# Patient Record
Sex: FEMALE | Race: ASIAN | NOT HISPANIC OR LATINO | URBAN - METROPOLITAN AREA
[De-identification: names, ages, dates, MRNs, and addresses within clinical notes are randomized per-mention and may not be internally consistent; named-entity substitution may affect disease eponyms.]

---

## 2017-11-02 ENCOUNTER — INPATIENT (INPATIENT)
Facility: HOSPITAL | Age: 82
LOS: 11 days | Discharge: HOME | DRG: 66 | End: 2017-11-14
Attending: INTERNAL MEDICINE | Admitting: INTERNAL MEDICINE
Payer: MEDICAID

## 2017-11-02 VITALS
HEART RATE: 87 BPM | HEIGHT: 60 IN | TEMPERATURE: 99 F | WEIGHT: 104.94 LBS | RESPIRATION RATE: 18 BRPM | SYSTOLIC BLOOD PRESSURE: 132 MMHG | OXYGEN SATURATION: 98 % | DIASTOLIC BLOOD PRESSURE: 70 MMHG

## 2017-11-02 DIAGNOSIS — I10 ESSENTIAL (PRIMARY) HYPERTENSION: ICD-10-CM

## 2017-11-02 DIAGNOSIS — I48.91 UNSPECIFIED ATRIAL FIBRILLATION: ICD-10-CM

## 2017-11-02 DIAGNOSIS — R47.81 SLURRED SPEECH: ICD-10-CM

## 2017-11-02 DIAGNOSIS — Z29.9 ENCOUNTER FOR PROPHYLACTIC MEASURES, UNSPECIFIED: ICD-10-CM

## 2017-11-02 LAB
ANION GAP SERPL CALC-SCNC: 8 MMOL/L — SIGNIFICANT CHANGE UP (ref 5–17)
ANION GAP SERPL CALC-SCNC: 8 MMOL/L — SIGNIFICANT CHANGE UP (ref 5–17)
APTT BLD: 42 SEC — HIGH (ref 27.5–37.4)
BASOPHILS # BLD AUTO: 0.1 K/UL — SIGNIFICANT CHANGE UP (ref 0–0.2)
BASOPHILS NFR BLD AUTO: 1.5 % — SIGNIFICANT CHANGE UP (ref 0–2)
BUN SERPL-MCNC: 25 MG/DL — HIGH (ref 7–18)
BUN SERPL-MCNC: 26 MG/DL — HIGH (ref 7–18)
CALCIUM SERPL-MCNC: 8.9 MG/DL — SIGNIFICANT CHANGE UP (ref 8.4–10.5)
CALCIUM SERPL-MCNC: 9.1 MG/DL — SIGNIFICANT CHANGE UP (ref 8.4–10.5)
CHLORIDE SERPL-SCNC: 107 MMOL/L — SIGNIFICANT CHANGE UP (ref 96–108)
CHLORIDE SERPL-SCNC: 108 MMOL/L — SIGNIFICANT CHANGE UP (ref 96–108)
CK MB BLD-MCNC: 1.3 % — SIGNIFICANT CHANGE UP (ref 0–3.5)
CK MB CFR SERPL CALC: 1.3 NG/ML — SIGNIFICANT CHANGE UP (ref 0–3.6)
CK MB CFR SERPL CALC: SIGNIFICANT CHANGE UP NG/ML (ref 0–3.6)
CK SERPL-CCNC: 100 U/L — SIGNIFICANT CHANGE UP (ref 21–215)
CK SERPL-CCNC: SIGNIFICANT CHANGE UP U/L (ref 21–215)
CO2 SERPL-SCNC: 24 MMOL/L — SIGNIFICANT CHANGE UP (ref 22–31)
CO2 SERPL-SCNC: 27 MMOL/L — SIGNIFICANT CHANGE UP (ref 22–31)
CREAT SERPL-MCNC: 1.16 MG/DL — SIGNIFICANT CHANGE UP (ref 0.5–1.3)
CREAT SERPL-MCNC: 1.22 MG/DL — SIGNIFICANT CHANGE UP (ref 0.5–1.3)
EOSINOPHIL # BLD AUTO: 0 K/UL — SIGNIFICANT CHANGE UP (ref 0–0.5)
EOSINOPHIL NFR BLD AUTO: 0.6 % — SIGNIFICANT CHANGE UP (ref 0–6)
GLUCOSE SERPL-MCNC: 84 MG/DL — SIGNIFICANT CHANGE UP (ref 70–99)
GLUCOSE SERPL-MCNC: 88 MG/DL — SIGNIFICANT CHANGE UP (ref 70–99)
HCT VFR BLD CALC: 38.3 % — SIGNIFICANT CHANGE UP (ref 34.5–45)
HGB BLD-MCNC: 12 G/DL — SIGNIFICANT CHANGE UP (ref 11.5–15.5)
INR BLD: 1.39 RATIO — HIGH (ref 0.88–1.16)
LYMPHOCYTES # BLD AUTO: 1.6 K/UL — SIGNIFICANT CHANGE UP (ref 1–3.3)
LYMPHOCYTES # BLD AUTO: 27.3 % — SIGNIFICANT CHANGE UP (ref 13–44)
MCHC RBC-ENTMCNC: 31.4 GM/DL — LOW (ref 32–36)
MCHC RBC-ENTMCNC: 31.4 PG — SIGNIFICANT CHANGE UP (ref 27–34)
MCV RBC AUTO: 100.2 FL — HIGH (ref 80–100)
MONOCYTES # BLD AUTO: 0.6 K/UL — SIGNIFICANT CHANGE UP (ref 0–0.9)
MONOCYTES NFR BLD AUTO: 10 % — SIGNIFICANT CHANGE UP (ref 2–14)
NEUTROPHILS # BLD AUTO: 3.5 K/UL — SIGNIFICANT CHANGE UP (ref 1.8–7.4)
NEUTROPHILS NFR BLD AUTO: 60.6 % — SIGNIFICANT CHANGE UP (ref 43–77)
PLATELET # BLD AUTO: 185 K/UL — SIGNIFICANT CHANGE UP (ref 150–400)
POTASSIUM SERPL-MCNC: 3.8 MMOL/L — SIGNIFICANT CHANGE UP (ref 3.5–5.3)
POTASSIUM SERPL-MCNC: SIGNIFICANT CHANGE UP MMOL/L (ref 3.5–5.3)
POTASSIUM SERPL-SCNC: 3.8 MMOL/L — SIGNIFICANT CHANGE UP (ref 3.5–5.3)
POTASSIUM SERPL-SCNC: SIGNIFICANT CHANGE UP MMOL/L (ref 3.5–5.3)
PROTHROM AB SERPL-ACNC: 15.2 SEC — HIGH (ref 9.8–12.7)
RBC # BLD: 3.82 M/UL — SIGNIFICANT CHANGE UP (ref 3.8–5.2)
RBC # FLD: 13.7 % — SIGNIFICANT CHANGE UP (ref 10.3–14.5)
SODIUM SERPL-SCNC: 139 MMOL/L — SIGNIFICANT CHANGE UP (ref 135–145)
SODIUM SERPL-SCNC: 143 MMOL/L — SIGNIFICANT CHANGE UP (ref 135–145)
TROPONIN I SERPL-MCNC: <0.015 NG/ML — SIGNIFICANT CHANGE UP (ref 0–0.04)
TROPONIN I SERPL-MCNC: <0.015 NG/ML — SIGNIFICANT CHANGE UP (ref 0–0.04)
WBC # BLD: 5.8 K/UL — SIGNIFICANT CHANGE UP (ref 3.8–10.5)
WBC # FLD AUTO: 5.8 K/UL — SIGNIFICANT CHANGE UP (ref 3.8–10.5)

## 2017-11-02 PROCEDURE — 99285 EMERGENCY DEPT VISIT HI MDM: CPT

## 2017-11-02 PROCEDURE — 71010: CPT | Mod: 26

## 2017-11-02 PROCEDURE — 70450 CT HEAD/BRAIN W/O DYE: CPT | Mod: 26

## 2017-11-02 RX ORDER — FENOFIBRATE,MICRONIZED 130 MG
145 CAPSULE ORAL DAILY
Qty: 0 | Refills: 0 | Status: DISCONTINUED | OUTPATIENT
Start: 2017-11-02 | End: 2017-11-14

## 2017-11-02 RX ORDER — HEPARIN SODIUM 5000 [USP'U]/ML
5000 INJECTION INTRAVENOUS; SUBCUTANEOUS EVERY 12 HOURS
Qty: 0 | Refills: 0 | Status: DISCONTINUED | OUTPATIENT
Start: 2017-11-02 | End: 2017-11-02

## 2017-11-02 RX ORDER — METOPROLOL TARTRATE 50 MG
12.5 TABLET ORAL
Qty: 0 | Refills: 0 | Status: DISCONTINUED | OUTPATIENT
Start: 2017-11-02 | End: 2017-11-02

## 2017-11-02 RX ORDER — ASPIRIN/CALCIUM CARB/MAGNESIUM 324 MG
325 TABLET ORAL ONCE
Qty: 0 | Refills: 0 | Status: COMPLETED | OUTPATIENT
Start: 2017-11-02 | End: 2017-11-02

## 2017-11-02 RX ORDER — ASPIRIN/CALCIUM CARB/MAGNESIUM 324 MG
81 TABLET ORAL DAILY
Qty: 0 | Refills: 0 | Status: DISCONTINUED | OUTPATIENT
Start: 2017-11-02 | End: 2017-11-03

## 2017-11-02 RX ORDER — RIVAROXABAN 15 MG-20MG
15 KIT ORAL EVERY 24 HOURS
Qty: 0 | Refills: 0 | Status: DISCONTINUED | OUTPATIENT
Start: 2017-11-02 | End: 2017-11-04

## 2017-11-02 RX ORDER — ATORVASTATIN CALCIUM 80 MG/1
10 TABLET, FILM COATED ORAL AT BEDTIME
Qty: 0 | Refills: 0 | Status: DISCONTINUED | OUTPATIENT
Start: 2017-11-02 | End: 2017-11-03

## 2017-11-02 RX ORDER — METOPROLOL TARTRATE 50 MG
25 TABLET ORAL
Qty: 0 | Refills: 0 | Status: DISCONTINUED | OUTPATIENT
Start: 2017-11-02 | End: 2017-11-14

## 2017-11-02 RX ADMIN — Medication 325 MILLIGRAM(S): at 20:03

## 2017-11-02 NOTE — H&P ADULT - PROBLEM SELECTOR PLAN 4
-Improve score 2, given age and immobilisation  -Heparin sub q -Improve score 2, given age and immobilisation.  -Heparin sub q

## 2017-11-02 NOTE — H&P ADULT - HISTORY OF PRESENT ILLNESS
86 y/o female with significant PMHx of HTN, BIB family to the ED c/o slurred speech x yesterday morning. Son reports pt was drinking water normally, pt started to have slurred speech. Went to PMD: suspected [place] Palsy, was sent to neurologist who sent pt to ED for CAT scan of head. Son reports pt's speech is slightly different as compared to baseline. Pt denies vomiting, CP, SOB, LOC, HA, diaphoresis, dizziness, visual aura, blurry vision, neck stiffness, numbness, weakness, tingling, use of drugs/ETOH/cigarettes, or any other complaints.      SH: 84 y/o female with significant PMHx of HTN, BIB family to the ED c/o slurred speech x yesterday morning. Son reports pt was drinking water normally, pt started to have slurred speech. Went to PMD: suspected [place] Palsy, was sent to neurologist who sent pt to ED for CAT scan of head. Son reports pt's speech is slightly different as compared to baseline. Pt denies vomiting, CP, SOB, LOC, HA, diaphoresis, dizziness, visual aura, blurry vision, neck stiffness, numbness, weakness, tingling, use of drugs/ETOH/cigarettes, or any other complaints.      SH: 86 y/o [place] speaking female ( ID [id number]) with significant PMHx of HTN, A fib (on Xarelto was brought to Ed by family for slurred speech x yesterday morning.   Son started to have slurring of speech.     Went to PMD: suspected [place] Palsy, was sent to neurologist who sent pt to ED for CAT scan of head. Son reports pt's speech is slightly different as compared to baseline.   Pt denies vomiting, CP, SOB, LOC, HA, diaphoresis, dizziness, visual aura, blurry vision, neck stiffness, numbness, weakness, tingling, use of drugs/ETOH/cigarettes, or any other complaints.      SH: 86 y/o [place] speaking female ( ID [id number]) with significant PMHx of HTN, A fib (on Xarelto was brought to Ed by family for slurred speech x yesterday morning.   Patient states that she started to have slurring of speech and went to PMD who suspected [place] Palsy, and sent patient to neurologist Dr Vega.   Dr Vega sent pt to ED for suspicion of stroke.  Son reports patient speech is improving but still have slurring of speech.     Denies nausea, vomiting, chest pain, shortness of breath, headache, abdominal pain or pedal edema.     SH: Non smoker, non alcoholic, denies illicit drug use.   FH: Htn in Father

## 2017-11-02 NOTE — ED PROVIDER NOTE - OBJECTIVE STATEMENT
Limited details in HPI and ROS obtained due to pt being slightly non-verbal: details as per family. 86 y/o female with significant PMHx of HTN, BIB family to the ED c/o slurred speech x yesterday morning. Son reports pt was drinking water normally, pt started to have slurred speech. Went to PMD: suspected [place] Palsy, was sent to neurologist who sent pt to ED for CAT scan of head. Son reports pt's speech is slightly different as compared to baseline. Pt denies vomiting, CP, SOB, LOC, HA, diaphoresis, dizziness, visual aura, blurry vision, neck stiffness, numbness, weakness, tingling, use of drugs/ETOH/cigarettes, or any other complaints. Currently taking: Xarelto. PMD: Dr. Dr. Springer

## 2017-11-02 NOTE — H&P ADULT - PROBLEM SELECTOR PLAN 1
-Patient presented with slurring of speech started today  -Admit to telemetry to r/o cardiac arrhythmias.  -T1 -ve, f/u T2 and t3  -EKG:  -Started aspirin, statin. Hold PO medication to allow permissive hypertension. Restart after 24 hours.   -Follow MRI head. MRA head and neck  -Follow Echo. -Patient presented with slurring of speech started yesterday concern for stroke  -Admit to telemetry to r/o cardiac arrhythmias.  -T1 -ve, f/u T2 and t3  -EKG: A fib at 82 bpm  -CT head No CT evidence for an acute territorial infarct. 2 small age indeterminate lacunar infarcts.  -Started aspirin, statin. Hold PO medication to allow permissive hypertension. Restart after 24 hours.   -Follow MRI head. MRA head and neck  -Follow Echo. -Patient presented with slurring of speech started yesterday concern for stroke  -Admit to telemetry to r/o cardiac arrhythmias.  -T1 -ve, f/u T2 and t3  -EKG: A fib at 82 bpm  -CT head No CT evidence for an acute territorial infarct. 2 small age indeterminate lacunar infarcts.  -Concerns for stroke as patient has h/o A Fib and is on subtherapeutic dose of Xarelto 15mg daily.   -Started aspirin, statin. Hold PO medication to allow permissive hypertension. Restart after 24 hours.   -Follow MRI head. MRA head and neck  -Follow Echo. -Patient presented with slurring of speech started yesterday concern for stroke  -Admit to telemetry to r/o cardiac arrhythmias.  -T1 -ve, f/u T2 and t3  -EKG: A fib at 82 bpm  -CT head No CT evidence for an acute territorial infarct. 2 small age indeterminate lacunar infarcts.  -Concerns for stroke as patient has h/o A Fib and but already on Xarelto 15mg daily. Reports compliance.   -Started aspirin, statin. Hold PO medication to allow permissive hypertension. Restart after 24 hours.   -Follow MRI head. MRA head and neck  -Follow Echo. -Patient presented with slurring of speech started yesterday concern for stroke  -Admit to telemetry to r/o cardiac arrhythmias.  -T1 -ve, f/u T2 and t3  -EKG: A fib at 82 bpm  -CT head No CT evidence for an acute territorial infarct. 2 small age indeterminate lacunar infarcts.  -Concerns for stroke as patient has h/o A Fib and but already on Xarelto 15mg daily. Reports compliance.   -Started aspirin, statin. Hold PO medication to allow permissive hypertension. Restart after 24 hours.   -Follow MRI head. MRA head and neck  -Follow Echo.  -Cardio Dr Knight. Neurology: Dr Damon consulted (confirmed with Dr Dyer, outpatient neurology)

## 2017-11-02 NOTE — ED PROVIDER NOTE - CONDUCTED A DETAILED DISCUSSION WITH PATIENT AND/OR GUARDIAN REGARDING, MDM
return to ED if symptoms worsen, persist or questions arise/need for outpatient follow-up need for outpatient follow-up/need to admit

## 2017-11-02 NOTE — H&P ADULT - PROBLEM SELECTOR PLAN 3
-Patient is on Xarelto for AC and on Toprol for rate control.   -Continue Toprol and Xarelto -Patient is on Xarelto for AC and on Toprol for rate control.   -Decrease metoprolol to 25 BID, for rate control and allow permissive htn.  -Continue Xarelto -Patient is on Xarelto for AC and on Toprol for rate control.   -Decrease metoprolol to 25 BID, for rate control and allow permissive htn.  -Continue Xarelto 15mg daily (dose approapiate accorduing to GFR) -Patient is on Xarelto for AC and on Toprol for rate control.   -Decrease metoprolol to 25 BID, for rate control and allow permissive htn.  -Continue Xarelto 15mg daily (dose appropriate according to GFR)

## 2017-11-02 NOTE — ED PROVIDER NOTE - CHPI ED SYMPTOMS NEG
no vomiting, CP, SOB, LOC, HA, diaphoresis, dizziness, visual aura, blurry vision, neck stiffness, numbness, weakness, tingling

## 2017-11-02 NOTE — H&P ADULT - ASSESSMENT
86 y/o [place] speaking female ( ID [id number]) with significant PMHx of HTN, A fib (on Xarelto was brought to Ed by family for slurred speech x yesterday morning.

## 2017-11-03 DIAGNOSIS — I63.9 CEREBRAL INFARCTION, UNSPECIFIED: ICD-10-CM

## 2017-11-03 LAB
ANION GAP SERPL CALC-SCNC: 8 MMOL/L — SIGNIFICANT CHANGE UP (ref 5–17)
BUN SERPL-MCNC: 25 MG/DL — HIGH (ref 7–18)
CALCIUM SERPL-MCNC: 8.5 MG/DL — SIGNIFICANT CHANGE UP (ref 8.4–10.5)
CHLORIDE SERPL-SCNC: 108 MMOL/L — SIGNIFICANT CHANGE UP (ref 96–108)
CHOLEST SERPL-MCNC: 105 MG/DL — SIGNIFICANT CHANGE UP (ref 10–199)
CK MB BLD-MCNC: <1.1 % — SIGNIFICANT CHANGE UP (ref 0–3.5)
CK MB CFR SERPL CALC: <1 NG/ML — SIGNIFICANT CHANGE UP (ref 0–3.6)
CK SERPL-CCNC: 87 U/L — SIGNIFICANT CHANGE UP (ref 21–215)
CO2 SERPL-SCNC: 26 MMOL/L — SIGNIFICANT CHANGE UP (ref 22–31)
CREAT SERPL-MCNC: 1.05 MG/DL — SIGNIFICANT CHANGE UP (ref 0.5–1.3)
GLUCOSE SERPL-MCNC: 78 MG/DL — SIGNIFICANT CHANGE UP (ref 70–99)
HBA1C BLD-MCNC: 5.4 % — SIGNIFICANT CHANGE UP (ref 4–5.6)
HCT VFR BLD CALC: 37 % — SIGNIFICANT CHANGE UP (ref 34.5–45)
HDLC SERPL-MCNC: 51 MG/DL — SIGNIFICANT CHANGE UP (ref 40–125)
HGB BLD-MCNC: 12 G/DL — SIGNIFICANT CHANGE UP (ref 11.5–15.5)
LIPID PNL WITH DIRECT LDL SERPL: 41 MG/DL — SIGNIFICANT CHANGE UP
MAGNESIUM SERPL-MCNC: 2.2 MG/DL — SIGNIFICANT CHANGE UP (ref 1.6–2.6)
MCHC RBC-ENTMCNC: 31.4 PG — SIGNIFICANT CHANGE UP (ref 27–34)
MCHC RBC-ENTMCNC: 32.3 GM/DL — SIGNIFICANT CHANGE UP (ref 32–36)
MCV RBC AUTO: 97.3 FL — SIGNIFICANT CHANGE UP (ref 80–100)
PHOSPHATE SERPL-MCNC: 3 MG/DL — SIGNIFICANT CHANGE UP (ref 2.5–4.5)
PLATELET # BLD AUTO: 179 K/UL — SIGNIFICANT CHANGE UP (ref 150–400)
POTASSIUM SERPL-MCNC: 3.6 MMOL/L — SIGNIFICANT CHANGE UP (ref 3.5–5.3)
POTASSIUM SERPL-SCNC: 3.6 MMOL/L — SIGNIFICANT CHANGE UP (ref 3.5–5.3)
RBC # BLD: 3.8 M/UL — SIGNIFICANT CHANGE UP (ref 3.8–5.2)
RBC # FLD: 13.4 % — SIGNIFICANT CHANGE UP (ref 10.3–14.5)
SODIUM SERPL-SCNC: 142 MMOL/L — SIGNIFICANT CHANGE UP (ref 135–145)
TOTAL CHOLESTEROL/HDL RATIO MEASUREMENT: 2.1 RATIO — LOW (ref 3.3–7.1)
TRIGL SERPL-MCNC: 63 MG/DL — SIGNIFICANT CHANGE UP (ref 10–149)
TROPONIN I SERPL-MCNC: <0.015 NG/ML — SIGNIFICANT CHANGE UP (ref 0–0.04)
TSH SERPL-MCNC: 6.49 UU/ML — HIGH (ref 0.34–4.82)
WBC # BLD: 5.3 K/UL — SIGNIFICANT CHANGE UP (ref 3.8–10.5)
WBC # FLD AUTO: 5.3 K/UL — SIGNIFICANT CHANGE UP (ref 3.8–10.5)

## 2017-11-03 PROCEDURE — 99223 1ST HOSP IP/OBS HIGH 75: CPT

## 2017-11-03 PROCEDURE — 70551 MRI BRAIN STEM W/O DYE: CPT | Mod: 26

## 2017-11-03 PROCEDURE — 70544 MR ANGIOGRAPHY HEAD W/O DYE: CPT | Mod: 26,59

## 2017-11-03 PROCEDURE — 70549 MR ANGIOGRAPH NECK W/O&W/DYE: CPT | Mod: 26

## 2017-11-03 RX ORDER — ASPIRIN/CALCIUM CARB/MAGNESIUM 324 MG
81 TABLET ORAL DAILY
Qty: 0 | Refills: 0 | Status: DISCONTINUED | OUTPATIENT
Start: 2017-11-03 | End: 2017-11-04

## 2017-11-03 RX ORDER — ASPIRIN/CALCIUM CARB/MAGNESIUM 324 MG
81 TABLET ORAL DAILY
Qty: 0 | Refills: 0 | Status: DISCONTINUED | OUTPATIENT
Start: 2017-11-03 | End: 2017-11-03

## 2017-11-03 RX ORDER — ATORVASTATIN CALCIUM 80 MG/1
40 TABLET, FILM COATED ORAL AT BEDTIME
Qty: 0 | Refills: 0 | Status: DISCONTINUED | OUTPATIENT
Start: 2017-11-03 | End: 2017-11-14

## 2017-11-03 RX ADMIN — RIVAROXABAN 15 MILLIGRAM(S): KIT at 21:34

## 2017-11-03 RX ADMIN — Medication 25 MILLIGRAM(S): at 05:56

## 2017-11-03 RX ADMIN — Medication 145 MILLIGRAM(S): at 14:32

## 2017-11-03 RX ADMIN — Medication 25 MILLIGRAM(S): at 19:20

## 2017-11-03 RX ADMIN — ATORVASTATIN CALCIUM 40 MILLIGRAM(S): 80 TABLET, FILM COATED ORAL at 21:34

## 2017-11-03 NOTE — CONSULT NOTE ADULT - ATTENDING COMMENTS
85-year-old woman with h/o A.fib, on Xarelto, now presented with 2-3 days h/o slurred speech and numbness in her tip of the tongue. No focal motor or sensory symptoms.   So, she may have a stroke; either right or left; cortical or subcortical. In this situation- has she failed Xarelto and needs another NOAC.  Going for an MRI-brain

## 2017-11-03 NOTE — CONSULT NOTE ADULT - PROBLEM SELECTOR RECOMMENDATION 2
on Xarelto for now. If her MRI-brain shows stroke, she needs 2D echo and change of her anti-coagulation from Xarelto to something different.

## 2017-11-03 NOTE — PROGRESS NOTE ADULT - SUBJECTIVE AND OBJECTIVE BOX
86 y/o [place] speaking female ( ID [id number]) with significant PMHx of HTN, A fib (on Xarelto was brought to Ed by family for slurred speech x yesterday morning.   Patient states that she started to have slurring of speech and went to PMD who suspected [place] Palsy, and sent patient to neurologist Dr Vega.   Dr Vega sent pt to ED for suspicion of stroke.  Son reports patient speech is improving but still have slurring of speech.     Denies nausea, vomiting, chest pain, shortness of breath, headache, abdominal pain or pedal edema.     SH: Non smoker, non alcoholic, denies illicit drug use.   FH: Htn in Father    Review of Systems:  · Negative General Symptoms	no fever; no chills; no sweating	  · Negative Ophthalmologic Symptoms	no diplopia; no photophobia	  · Negative Respiratory and Thorax Symptoms	no wheezing; no dyspnea; no cough	  · Negative Cardiovascular Symptoms	no chest pain; no palpitations; no dyspnea on exertion	  · Negative Gastrointestinal Symptoms	no nausea; no vomiting; no diarrhea; no constipation	  · Negative General Genitourinary Symptoms	no hematuria; no renal colic	  · Negative Neurological Symptoms	no transient paralysis; no weakness; no paresthesias	      pt seen in icu [  ], reg med floor [   ], bed [  ], chair at bedside [   ], a+o x3 [  ], lethargic [  ],  nad [  ]    moody [  ], ngt [  ], peg [  ], et tube [  ], cent line [  ], picc line [  ]        Allergies    No Known Allergies        Vitals    T(F): 98.1 (11-03-17 @ 07:58), Max: 98.7 (11-02-17 @ 16:29)  HR: 70 (11-03-17 @ 07:58) (70 - 93)  BP: 134/85 (11-03-17 @ 07:58) (129/74 - 155/89)  RR: 18 (11-03-17 @ 07:58) (18 - 18)  SpO2: 100% (11-03-17 @ 07:58) (98% - 100%)  Wt(kg): --  CAPILLARY BLOOD GLUCOSE      POCT Blood Glucose.: 82 mg/dL (02 Nov 2017 16:55)      Labs                          12.0   5.3   )-----------( 179      ( 03 Nov 2017 06:39 )             37.0       11-03    142  |  108  |  25<H>  ----------------------------<  78  3.6   |  26  |  1.05    Ca    8.5      03 Nov 2017 06:39  Phos  3.0     11-03  Mg     2.2     11-03        CARDIAC MARKERS ( 03 Nov 2017 02:49 )  <0.015 ng/mL / x     / 87 U/L / x     / <1.0 ng/mL  CARDIAC MARKERS ( 02 Nov 2017 19:13 )  <0.015 ng/mL / x     / 100 U/L / x     / 1.3 ng/mL  CARDIAC MARKERS ( 02 Nov 2017 18:08 )  hemolzyed The new reference range for Troponin-I performed on the Siemens Vista  system is 0.015-0.045 ng/mL, which includes the 99th percentile of a  healthy reference population. Studies have shown that elevated troponin  levels above the 99th percentile cutoff are associated with an increased  risk for adverse cardiac events, with the risk increasing as troponin  levels increase. As per a joint committee of the American College of  Cardiology and European Society of Cardiology, diagnosis of classic MI is  based upon the detection of a rise or fall of cardiac troponin values,  with at least one value above the 99th percentile upper reference limit,  in the appropriate clinical context.  Troponin-I (ng/mL) Interpretation  0.00-0.045 Normal range (includes the 99th percentile of a healthy  reference population)  >0.045 Elevated troponin level indicating increased risk  Note: Troponin-I and Troponin-T cannot be used interchangeably in serial  measurements. Minimally elevated Troponin results should be interpreted  in the context of clinical findings and risk factors. ng/mL / x     / hemolzyed notified thu zuñiga 11/02/17 18:37 U/L / x     / hemolzyed ng/mL            Radiology Results      Meds    MEDICATIONS  (STANDING):  aspirin  chewable 81 milliGRAM(s) Oral daily  atorvastatin 40 milliGRAM(s) Oral at bedtime  fenofibrate Tablet 145 milliGRAM(s) Oral daily  metoprolol     tartrate 25 milliGRAM(s) Oral two times a day  rivaroxaban 15 milliGRAM(s) Oral every 24 hours      MEDICATIONS  (PRN):      Physical Exam    Neuro :  no focal deficits  Respiratory: CTA B/L  CV: RRR, S1S2, no murmurs,   Abdominal: Soft, NT, ND +BS,  Extremities: No edema, + peripheral pulses    ASSESSMENT    Slurred speech  HTN (hypertension)  No significant past surgical history      PLAN 84 y/o [place] speaking female ( ID [id number]) with significant PMHx of HTN, A fib (on Xarelto was brought to Ed by family for slurred speech x yesterday morning.   Patient states that she started to have slurring of speech and went to PMD who suspected [place] Palsy, and sent patient to neurologist Dr Vega.   Dr Vega sent pt to ED for suspicion of stroke.  Son reports patient speech is improving but still have slurring of speech.     Denies nausea, vomiting, chest pain, shortness of breath, headache, abdominal pain or pedal edema.     SH: Non smoker, non alcoholic, denies illicit drug use.   FH: Htn in Father    Review of Systems:  · Negative General Symptoms	no fever; no chills; no sweating	  · Negative Ophthalmologic Symptoms	no diplopia; no photophobia	  · Negative Respiratory and Thorax Symptoms	no wheezing; no dyspnea; no cough	  · Negative Cardiovascular Symptoms	no chest pain; no palpitations; no dyspnea on exertion	  · Negative Gastrointestinal Symptoms	no nausea; no vomiting; no diarrhea; no constipation	  · Negative General Genitourinary Symptoms	no hematuria; no renal colic	  · Negative Neurological Symptoms	no transient paralysis; no weakness; no paresthesias	      pt seen in tele [x  ], reg med floor [   ], bed [x  ], chair at bedside [   ], a+o x3 [ x ], lethargic [  ],  nad [x  ]    Allergies    No Known Allergies        Vitals    T(F): 98.1 (11-03-17 @ 07:58), Max: 98.7 (11-02-17 @ 16:29)  HR: 70 (11-03-17 @ 07:58) (70 - 93)  BP: 134/85 (11-03-17 @ 07:58) (129/74 - 155/89)  RR: 18 (11-03-17 @ 07:58) (18 - 18)  SpO2: 100% (11-03-17 @ 07:58) (98% - 100%)  Wt(kg): --  CAPILLARY BLOOD GLUCOSE      POCT Blood Glucose.: 82 mg/dL (02 Nov 2017 16:55)      Labs                          12.0   5.3   )-----------( 179      ( 03 Nov 2017 06:39 )             37.0       11-03    142  |  108  |  25<H>  ----------------------------<  78  3.6   |  26  |  1.05    Ca    8.5      03 Nov 2017 06:39  Phos  3.0     11-03  Mg     2.2     11-03        CARDIAC MARKERS ( 03 Nov 2017 02:49 )  <0.015 ng/mL / x     / 87 U/L / x     / <1.0 ng/mL  CARDIAC MARKERS ( 02 Nov 2017 19:13 )  <0.015 ng/mL / x     / 100 U/L / x     / 1.3 ng/mL  CARDIAC MARKERS ( 02 Nov 2017 18:08 )  hemolzyed The new reference range for Troponin-I performed on the Siemens Vista  system is 0.015-0.045 ng/mL, which includes the 99th percentile of a  healthy reference population. Studies have shown that elevated troponin  levels above the 99th percentile cutoff are associated with an increased  risk for adverse cardiac events, with the risk increasing as troponin  levels increase. As per a joint committee of the American College of  Cardiology and European Society of Cardiology, diagnosis of classic MI is  based upon the detection of a rise or fall of cardiac troponin values,  with at least one value above the 99th percentile upper reference limit,  in the appropriate clinical context.  Troponin-I (ng/mL) Interpretation  0.00-0.045 Normal range (includes the 99th percentile of a healthy  reference population)  >0.045 Elevated troponin level indicating increased risk  Note: Troponin-I and Troponin-T cannot be used interchangeably in serial  measurements. Minimally elevated Troponin results should be interpreted  in the context of clinical findings and risk factors. ng/mL / x     / hemolzyed notified thu zuñiga 11/02/17 18:37 U/L / x     / hemolzyed ng/mL            Radiology Results      Meds    MEDICATIONS  (STANDING):  aspirin  chewable 81 milliGRAM(s) Oral daily  atorvastatin 40 milliGRAM(s) Oral at bedtime  fenofibrate Tablet 145 milliGRAM(s) Oral daily  metoprolol     tartrate 25 milliGRAM(s) Oral two times a day  rivaroxaban 15 milliGRAM(s) Oral every 24 hours      MEDICATIONS  (PRN):      Physical Exam    Neuro :  no focal deficits  Respiratory: CTA B/L  CV: RRR, S1S2, no murmurs,   Abdominal: Soft, NT, ND +BS,  Extremities: No edema, + peripheral pulses    ASSESSMENT    Slurred speech  possible tia  r/o cva  h/o HTN (hypertension)  A fib   No significant past surgical history      PLAN    f/u mri/ mra  neuro cons  cardio cons  cardiac enzymes x 2 neg  cont current meds

## 2017-11-03 NOTE — PROGRESS NOTE ADULT - PROBLEM SELECTOR PLAN 1
-Patient admitted to tele with concern for stroke  -MRI pos for acute lacunar stroke to the rt corona radiata  -EKG: A fib at 82 bpm  -c/w aspirin, statin  -c/w BP meds as symptoms started over 48hrs prior  -Follow Echo.  -Cardio Dr Knight.   -Neurology: Dr Damon -Patient admitted to tele with concern for stroke  -MRI pos for acute lacunar stroke to the rt corona radiata  -EKG: A fib at 82 bpm  -c/w aspirin, statin  -c/w BP meds as symptoms started over 48hrs prior  -Follow Echo  -f/u speech and swallow eval   -neuro checks q 4  -Cardio Dr Knight.   -Neurology: Dr Damon

## 2017-11-03 NOTE — CONSULT NOTE ADULT - SUBJECTIVE AND OBJECTIVE BOX
History of Present Illness:    84 y/o [place] speaking female ( ID [id number]) with significant PMHx of HTN, A fib (on Xarelto was brought to Ed by family for slurred speech x yesterday morning.   Patient states that she started to have slurring of speech and went to PMD who suspected [place] Palsy, and sent patient to neurologist Dr Vega.   Dr Vega sent pt to ED for suspicion of stroke.  Son reports patient speech is improving but still have slurring of speech.     Denies nausea, vomiting, chest pain, shortness of breath, headache, abdominal pain or pedal edema.     On the floor: Her son was present to translate her language. She still has slurred/dysarthric speech and some numbness at the tip of the tongue. No headaches, blurred vision, double vision or loss of vision. No focal motor or sensory symptoms. She was seen by her local neurologist who advised them to come to ED for evaluation. She is awaiting for MRI- brain now.    Allergies    No Known Allergies    Intolerances      PAST MEDICAL & SURGICAL HISTORY:  HTN (hypertension)  No significant past surgical history  A.fib    Social History: [ ] Tobacco use, [ ] Alcohol use.  none      MEDICATIONS  (STANDING):  atorvastatin 40 milliGRAM(s) Oral at bedtime  fenofibrate Tablet 145 milliGRAM(s) Oral daily  metoprolol     tartrate 25 milliGRAM(s) Oral two times a day  rivaroxaban 15 milliGRAM(s) Oral every 24 hours    Family:  FAMILY HISTORY:  none      Review of Systems:  General: [ ] None, [ ] chills,[ ] fatigue,[ ] fevers  Skin: [ ] None, [ ] rash present  HEENT: [ ] None, [ ] head injury,[ ] blurred vision, [ ] double vision, [ ] eye pain, [ ] visual loss, [ ] hearing loss, [ ] deafness, [ ] ear pain, [ ] ringing in the ears, [ ] vertigo, [ ] sinus pain, [ ] voice changes  Neck: [ ] None, [ ] Neck stiffness  Respiratory: [ ]  None, [ ] cough, [ ] difficulty breathing  Cardiovascular: [ ] None,  [ ] calf cramps, [ ] chest pain, [ ] leg pain, [ ] swelling, [ ] rapid heart rate, [ ] shortness of breath  Gastrointestinal: [ ] None, [ ] abdominal pain, [ ] nausea, [ ] vomiting  Musculoskeletal: [ ] None, [ ] back pain, [ ] joint pain, [ ] joint stiffness, [ ] leg cramps, [ ] muscle atrophy, [ ] muscle cramps, [ ] muscle weakness, [ ] swelling of extremities  Neurological: [ ] None,  [ ] Dizziness, [ ] decreased memory, [ ] fainting, [ ] focal neurological symptoms, [ ] headaches, [ ] incontinence of stool, [ ] incontinence of urine, [ ] loss of consciousness, [x ] numbness(tip of the tongue), [ ] seizures, [ ] spinning sensation, [ ] stroke, [ ] trouble walking, [ ] unsteadiness, [ ] visual changes,  [ ] weakness, [ ] tremors, [ ] rigidity, [ ] slowness, dysarthric speech  Psychiatric: [ ] None,  [ ] depression, [ ] anxiety, [ ] hallucinations, [ ] inability to concentrate,[ ] mood changes, [ ] panic attacks  Hematology: [ ] None, [ ] blood clots, [ ] spontaneous bleeding    [x ]  None except marked above      Vital Signs:    T(C): 36.9 (11-03-17 @ 13:00), Max: 37.1 (11-02-17 @ 16:29)  HR: 84 (11-03-17 @ 13:00) (70 - 93)  BP: 129/70 (11-03-17 @ 13:00) (129/70 - 155/89)  RR: 16 (11-03-17 @ 13:00) (16 - 18)  SpO2: 100% (11-03-17 @ 13:00) (98% - 100%)    Physical Exam:  General:  General Appearance - Well groomed, Not sickly   Build and nutrition - Well nourished and Well developed  Posture - Normal posture    Head and Neck:  Head - normocephalic, atraumatic with no lesions or palpable masses    Chest and Lung exam:  Quiet, even and easy respiratory effort with no use of accessory muscles and on ausculation, normal breath sounds, no adventitious sounds and normal vocal resonance    Cardiovascular:  Auscultation: Normal heart sounds  Murmurs & Other heart sounds: Auscultation of the heart reveals- no murmurs  Carotid arteris: No Carotid bruits    Abdomen:  Palpation/Percussion: Non Tender, No Rebound tenderness, No hepatosplenomegaly, and No palpable abdominal masses    Peripheral Vascular:  Lower extremity: Inspection - Bilateral - No varicose veins  Palpation: Tenderness - bilateral - Non tender  Dorsalis pedis pulse - bilateral - normal  Edema - bilateral - no edema    Neurologic Exam:  Mental Status -  Alert, Awake  Fund of Knowledge – Normal  Affect- appropriate  Recent Memory – Normal, Memory loss [ ] Mild, [ ] Moderate, [ ] Severe  Remote Memory – Normal  Attention Span – Normal  Concentration –  Normal  Cognitive function – Normal  Speech – Dysarthric  Thought content/perception – Normal    Cranial Nerves:  II Optic: Visual acuity – Bilateral - Normal ; Visual fields – normal ; Fundi – Bilateral – no optic atrophy or Papilledema  III Oculomotor – Normal bilaterally  IV Trochlear – Bilateral – Normal  V Trigeminal: Ophthalmic – Bilateral – Normal;  Maxillary – Bilateral- Normal; Mandibular – Bilateral - Normal  VI Abducens – Bilateral - Normal  VII Facial: Normal bilaterally  VIII Acoustic - Bilateral – hearing normal and (hearing tested by finger rub)  IX Glossopharyngeal / X Vagus: Uvula – Normal  XI Accessory: Normal Shoulder Shrug  XII Hypoglossal – Bilaterally Normal  Eye Movements: Gaze – Bilateral - Normal    Nystagmus – Bilateral – None  Motor:  Bulk and Contour: Normal  Tone: Normal  Strength:                                                             Delt              Bicep           Tricep                                 Upper Extremities:   Right              5/5               5/5               5/5                5/5                                                          Left                5/5               5/5               5/5                5/5                                                                                  HF                 KE                KF                   DF                     Lower Extremities:   Right             5/5               5/5              5/5                  5/5                                                          Left               5/5               5/5              5/5                  5/5  General Assessment of Reflexes: Right Wrist - 2+ ;  Left Wrist - 2+ ; Right Elbow - 2+ ;  Left Elbow - 2+ ;  Right Knee - 2+ ;  Left Knee - 2+ ;   Right Ankle – 2+ ; Left Ankle – 2+  Plantar Reflexes(Babinski) (L4-S2) – Bilateral – Flexion  Sensory:  Light Touch: Intact – Globally  Pain: Intact – Globally  Temperature: Intact – Globally  Coordination – No Impairment of heel-to-shin, Impairment of finger-to-nose or Impairment of rapid alternating movement  Gait – Normal tandem walking, Normal heel walking and Normal toes walking  Romberg’s sign: - Normal
CHIEF COMPLAINT:Patient is a 85y old  Female who presents with a chief complaint of Slurring of speech (02 Nov 2017 21:22)      HPI:  85 yr old  [place] speaking female with significant PMHx of HTN, A fib (on Xarelto was brought to Ed by family for slurred speech x yesterday morning.   Patient states that she started to have slurring of speech and went to PMD who suspected [place] Palsy, and sent patient to neurologist Dr Vega.   Dr Vega sent pt to ED for suspicion of stroke.  Son reports patient speech is improving but still have slurring of speech.Denies nausea, vomiting, chest pain, shortness of breath, headache, abdominal pain or pedal edema.         PAST MEDICAL & SURGICAL HISTORY:  HTN (hypertension)  Afib      MEDICATIONS  (STANDING):  aspirin  chewable 81 milliGRAM(s) Oral daily  atorvastatin 40 milliGRAM(s) Oral at bedtime  fenofibrate Tablet 145 milliGRAM(s) Oral daily  metoprolol     tartrate 25 milliGRAM(s) Oral two times a day  rivaroxaban 15 milliGRAM(s) Oral every 24 hours    MEDICATIONS  (PRN):      FAMILY HISTORY:  Htn in Father (02 Nov 2017 21:22)      SOCIAL HISTORY:  Non smoker, non alcoholic, denies illicit drug use.       Allergies    No Known Allergies    Intolerances    	    REVIEW OF SYSTEMS:  CONSTITUTIONAL: No fever, weight loss, or fatigue  EYES: No eye pain, visual disturbances, or discharge  ENT:  No difficulty hearing, tinnitus, vertigo; No sinus or throat pain  NECK: No pain or stiffness  RESPIRATORY: No cough, wheezing, chills or hemoptysis; No Shortness of Breath  CARDIOVASCULAR: No chest pain, palpitations, passing out, dizziness, or leg swelling  GASTROINTESTINAL: No abdominal or epigastric pain. No nausea, vomiting, or hematemesis; No diarrhea or constipation. No melena or hematochezia.  GENITOURINARY: No dysuria, frequency, hematuria, or incontinence  NEUROLOGICAL: No headaches, memory loss, loss of strength, numbness, or tremors  SKIN: No itching, burning, rashes, or lesions   LYMPH Nodes: No enlarged glands  ENDOCRINE: No heat or cold intolerance; No hair loss  MUSCULOSKELETAL: No joint pain or swelling; No muscle, back, or extremity pain  PSYCHIATRIC: No depression, anxiety, mood swings, or difficulty sleeping  HEME/LYMPH: No easy bruising, or bleeding gums  ALLERGY AND IMMUNOLOGIC: No hives or eczema	      PHYSICAL EXAM:  T(C): 36.7 (11-03-17 @ 07:58), Max: 37.1 (11-02-17 @ 16:29)  HR: 70 (11-03-17 @ 07:58) (70 - 93)  BP: 134/85 (11-03-17 @ 07:58) (129/74 - 155/89)  RR: 18 (11-03-17 @ 07:58) (18 - 18)  SpO2: 100% (11-03-17 @ 07:58) (98% - 100%)      Appearance: Normal	  HEENT:   Normal oral mucosa, PERRL, EOMI	  Lymphatic: No lymphadenopathy  Cardiovascular: Normal S1 S2, No JVD, No murmurs, No edema  Respiratory: Lungs clear to auscultation	  Psychiatry: A & O x 3, Mood & affect appropriate  Gastrointestinal:  Soft, Non-tender, + BS	  Skin: No rashes, No ecchymoses, No cyanosis	  Neurologic: Non-focal  Extremities: Normal range of motion, No clubbing, cyanosis or edema  Vascular: Peripheral pulses palpable 2+ bilaterally      ECG:  	afib, nl axis    	  LABS:	 	    CARDIAC MARKERS:  CARDIAC MARKERS ( 03 Nov 2017 02:49 )  <0.015 ng/mL / x     / 87 U/L / x     / <1.0 ng/mL  CARDIAC MARKERS ( 02 Nov 2017 19:13 )  <0.015 ng/mL / x     / 100 U/L / x     / 1.3 ng/mL                              12.0   5.3   )-----------( 179      ( 03 Nov 2017 06:39 )             37.0     11-03    142  |  108  |  25<H>  ----------------------------<  78  3.6   |  26  |  1.05    Ca    8.5      03 Nov 2017 06:39  Phos  3.0     11-03  Mg     2.2     11-03        Lipid Profile: Cholesterol 105  LDL 41  HDL 51  TG 63      TSH: Thyroid Stimulating Hormone, Serum: 6.49 uU/mL (11-03 @ 06:39)    Ct head:    Impression: No acute intracranial hemorrhage. No CT evidence for an acute   territorial infarct.    Chronic small vessel ischemic disease.    2 small age indeterminate lacunar infarcts. If clinically indicated,   brain MR may be pursued for further evaluation.

## 2017-11-03 NOTE — PROGRESS NOTE ADULT - SUBJECTIVE AND OBJECTIVE BOX
PGY 1 Note discussed with supervising resident and primary attending    Patient is a 85y old  Female who presents with a chief complaint of Slurring of speech (02 Nov 2017 21:22)    INTERVAL HPI/OVERNIGHT EVENTS:   No acute events reported overnight.    Today pt presents in no acute distress.  Pt found resting comfortably in bed. pt continue to exude slurred speech and mild rt lip droop.  Symptoms dont seem to be currently evolving.       MEDICATIONS  (STANDING):  aspirin  chewable 81 milliGRAM(s) Oral daily  atorvastatin 40 milliGRAM(s) Oral at bedtime  fenofibrate Tablet 145 milliGRAM(s) Oral daily  metoprolol     tartrate 25 milliGRAM(s) Oral two times a day  rivaroxaban 15 milliGRAM(s) Oral every 24 hours      __________________________________________________  REVIEW OF SYSTEMS:    CONSTITUTIONAL: No fever,   EYES: no acute visual disturbances  NECK: No pain or stiffness  RESPIRATORY: No cough; No shortness of breath  CARDIOVASCULAR: No chest pain, no palpitations  GASTROINTESTINAL: No pain. No nausea or vomiting; No diarrhea   NEUROLOGICAL: No headache or numbness, no tremors; Rt facial weakness  MUSCULOSKELETAL: No joint pain, no muscle pain  GENITOURINARY: no dysuria, no frequency, no hesitancy  PSYCHIATRY: no depression , no anxiety  ALL OTHER  ROS negative        Vital Signs Last 24 Hrs  T(C): 36.9 (03 Nov 2017 13:00), Max: 37.1 (02 Nov 2017 16:29)  T(F): 98.5 (03 Nov 2017 13:00), Max: 98.7 (02 Nov 2017 16:29)  HR: 84 (03 Nov 2017 13:00) (70 - 93)  BP: 129/70 (03 Nov 2017 13:00) (129/70 - 155/89)  BP(mean): --  RR: 16 (03 Nov 2017 13:00) (16 - 18)  SpO2: 100% (03 Nov 2017 13:00) (98% - 100%)    ________________________________________________  PHYSICAL EXAM:  GENERAL: NAD  HEENT: Normocephalic;  conjunctivae and sclerae clear; moist mucous membranes;   NECK : supple  CHEST/LUNG: Clear to auscultation bilaterally with good air entry   HEART: S1 S2  regular; no murmurs, gallops or rubs  ABDOMEN: Soft, Nontender, Nondistended; Bowel sounds present  EXTREMITIES: no cyanosis; no edema; no calf tenderness  NERVOUS SYSTEM:  Awake and alert; Oriented  to place, person and time ; Rt lower lip droop, and tongue deviation to the left.  Slurred speech.  No further focal neurological deficits.     _________________________________________________  LABS:                        12.0   5.3   )-----------( 179      ( 03 Nov 2017 06:39 )             37.0     11-03    142  |  108  |  25<H>  ----------------------------<  78  3.6   |  26  |  1.05    Ca    8.5      03 Nov 2017 06:39  Phos  3.0     11-03  Mg     2.2     11-03      PT/INR - ( 02 Nov 2017 17:44 )   PT: 15.2 sec;   INR: 1.39 ratio         PTT - ( 02 Nov 2017 17:44 )  PTT:42.0 sec    CAPILLARY BLOOD GLUCOSE      POCT Blood Glucose.: 82 mg/dL (02 Nov 2017 16:55)        RADIOLOGY & ADDITIONAL TESTS:    Imaging Personally Reviewed:  YES  Consultant(s) Notes Reviewed:   YES    Care Discussed with Consultants :     Plan of care was discussed with patient and /or primary care giver; all questions and concerns were addressed and care was aligned with patient's wishes.

## 2017-11-03 NOTE — CONSULT NOTE ADULT - ASSESSMENT
1) Stroke- ? right or left ? cortical or subcortical  2) ? Failed Xarelto  3) A,Fib
85 [facility] significant PMHx of HTN, A fib (on Xarelto was brought to Ed by family for slurred speech x yesterday morning.   1.Neurology eval.  2.MRI-p.  3.Afib-lopressor,xarelto.  4.Statin.  5.PPI.

## 2017-11-03 NOTE — PROGRESS NOTE ADULT - PROBLEM SELECTOR PLAN 3
-Patient is on Xarelto for AC and on Toprol for rate control.   -Decrease metoprolol to 25 BID, for rate control and allow permissive htn.  -Continue Xarelto 15mg daily (dose appropriate according to GFR)

## 2017-11-04 DIAGNOSIS — I48.2 CHRONIC ATRIAL FIBRILLATION: ICD-10-CM

## 2017-11-04 DIAGNOSIS — I63.411 CEREBRAL INFARCTION DUE TO EMBOLISM OF RIGHT MIDDLE CEREBRAL ARTERY: ICD-10-CM

## 2017-11-04 LAB
ANION GAP SERPL CALC-SCNC: 5 MMOL/L — SIGNIFICANT CHANGE UP (ref 5–17)
APTT BLD: >200 SEC — CRITICAL HIGH (ref 27.5–37.4)
APTT BLD: >200 SEC — CRITICAL HIGH (ref 27.5–37.4)
BUN SERPL-MCNC: 26 MG/DL — HIGH (ref 7–18)
CALCIUM SERPL-MCNC: 8.6 MG/DL — SIGNIFICANT CHANGE UP (ref 8.4–10.5)
CHLORIDE SERPL-SCNC: 111 MMOL/L — HIGH (ref 96–108)
CO2 SERPL-SCNC: 29 MMOL/L — SIGNIFICANT CHANGE UP (ref 22–31)
CREAT SERPL-MCNC: 1.06 MG/DL — SIGNIFICANT CHANGE UP (ref 0.5–1.3)
GLUCOSE SERPL-MCNC: 89 MG/DL — SIGNIFICANT CHANGE UP (ref 70–99)
HCT VFR BLD CALC: 38.3 % — SIGNIFICANT CHANGE UP (ref 34.5–45)
HCT VFR BLD CALC: 41 % — SIGNIFICANT CHANGE UP (ref 34.5–45)
HGB BLD-MCNC: 12.2 G/DL — SIGNIFICANT CHANGE UP (ref 11.5–15.5)
HGB BLD-MCNC: 13 G/DL — SIGNIFICANT CHANGE UP (ref 11.5–15.5)
MAGNESIUM SERPL-MCNC: 2.3 MG/DL — SIGNIFICANT CHANGE UP (ref 1.6–2.6)
MCHC RBC-ENTMCNC: 31.3 PG — SIGNIFICANT CHANGE UP (ref 27–34)
MCHC RBC-ENTMCNC: 31.6 GM/DL — LOW (ref 32–36)
MCHC RBC-ENTMCNC: 31.7 PG — SIGNIFICANT CHANGE UP (ref 27–34)
MCHC RBC-ENTMCNC: 32 GM/DL — SIGNIFICANT CHANGE UP (ref 32–36)
MCV RBC AUTO: 99.2 FL — SIGNIFICANT CHANGE UP (ref 80–100)
MCV RBC AUTO: 99.2 FL — SIGNIFICANT CHANGE UP (ref 80–100)
PHOSPHATE SERPL-MCNC: 3.1 MG/DL — SIGNIFICANT CHANGE UP (ref 2.5–4.5)
PLATELET # BLD AUTO: 184 K/UL — SIGNIFICANT CHANGE UP (ref 150–400)
PLATELET # BLD AUTO: 206 K/UL — SIGNIFICANT CHANGE UP (ref 150–400)
POTASSIUM SERPL-MCNC: 3.7 MMOL/L — SIGNIFICANT CHANGE UP (ref 3.5–5.3)
POTASSIUM SERPL-SCNC: 3.7 MMOL/L — SIGNIFICANT CHANGE UP (ref 3.5–5.3)
RBC # BLD: 3.86 M/UL — SIGNIFICANT CHANGE UP (ref 3.8–5.2)
RBC # BLD: 4.14 M/UL — SIGNIFICANT CHANGE UP (ref 3.8–5.2)
RBC # FLD: 13.1 % — SIGNIFICANT CHANGE UP (ref 10.3–14.5)
RBC # FLD: 13.8 % — SIGNIFICANT CHANGE UP (ref 10.3–14.5)
SODIUM SERPL-SCNC: 145 MMOL/L — SIGNIFICANT CHANGE UP (ref 135–145)
WBC # BLD: 5.3 K/UL — SIGNIFICANT CHANGE UP (ref 3.8–10.5)
WBC # BLD: 7.3 K/UL — SIGNIFICANT CHANGE UP (ref 3.8–10.5)
WBC # FLD AUTO: 5.3 K/UL — SIGNIFICANT CHANGE UP (ref 3.8–10.5)
WBC # FLD AUTO: 7.3 K/UL — SIGNIFICANT CHANGE UP (ref 3.8–10.5)

## 2017-11-04 PROCEDURE — 99233 SBSQ HOSP IP/OBS HIGH 50: CPT

## 2017-11-04 RX ORDER — HEPARIN SODIUM 5000 [USP'U]/ML
INJECTION INTRAVENOUS; SUBCUTANEOUS
Qty: 25000 | Refills: 0 | Status: DISCONTINUED | OUTPATIENT
Start: 2017-11-04 | End: 2017-11-04

## 2017-11-04 RX ORDER — HEPARIN SODIUM 5000 [USP'U]/ML
2900 INJECTION INTRAVENOUS; SUBCUTANEOUS EVERY 6 HOURS
Qty: 0 | Refills: 0 | Status: DISCONTINUED | OUTPATIENT
Start: 2017-11-04 | End: 2017-11-04

## 2017-11-04 RX ORDER — INFLUENZA VIRUS VACCINE 15; 15; 15; 15 UG/.5ML; UG/.5ML; UG/.5ML; UG/.5ML
0.5 SUSPENSION INTRAMUSCULAR ONCE
Qty: 0 | Refills: 0 | Status: COMPLETED | OUTPATIENT
Start: 2017-11-04 | End: 2017-11-11

## 2017-11-04 RX ORDER — PANTOPRAZOLE SODIUM 20 MG/1
40 TABLET, DELAYED RELEASE ORAL
Qty: 0 | Refills: 0 | Status: DISCONTINUED | OUTPATIENT
Start: 2017-11-04 | End: 2017-11-14

## 2017-11-04 RX ORDER — HEPARIN SODIUM 5000 [USP'U]/ML
INJECTION INTRAVENOUS; SUBCUTANEOUS
Qty: 25000 | Refills: 0 | Status: DISCONTINUED | OUTPATIENT
Start: 2017-11-04 | End: 2017-11-06

## 2017-11-04 RX ORDER — WARFARIN SODIUM 2.5 MG/1
5 TABLET ORAL ONCE
Qty: 0 | Refills: 0 | Status: COMPLETED | OUTPATIENT
Start: 2017-11-04 | End: 2017-11-04

## 2017-11-04 RX ORDER — WARFARIN SODIUM 2.5 MG/1
5 TABLET ORAL ONCE
Qty: 0 | Refills: 0 | Status: COMPLETED | OUTPATIENT
Start: 2017-11-05 | End: 2017-11-05

## 2017-11-04 RX ORDER — HEPARIN SODIUM 5000 [USP'U]/ML
2900 INJECTION INTRAVENOUS; SUBCUTANEOUS ONCE
Qty: 0 | Refills: 0 | Status: DISCONTINUED | OUTPATIENT
Start: 2017-11-04 | End: 2017-11-04

## 2017-11-04 RX ADMIN — Medication 25 MILLIGRAM(S): at 18:54

## 2017-11-04 RX ADMIN — WARFARIN SODIUM 5 MILLIGRAM(S): 2.5 TABLET ORAL at 22:27

## 2017-11-04 RX ADMIN — Medication 25 MILLIGRAM(S): at 05:32

## 2017-11-04 RX ADMIN — HEPARIN SODIUM 0 UNIT(S)/HR: 5000 INJECTION INTRAVENOUS; SUBCUTANEOUS at 22:26

## 2017-11-04 RX ADMIN — Medication 145 MILLIGRAM(S): at 11:52

## 2017-11-04 RX ADMIN — HEPARIN SODIUM 600 UNIT(S)/HR: 5000 INJECTION INTRAVENOUS; SUBCUTANEOUS at 12:30

## 2017-11-04 RX ADMIN — ATORVASTATIN CALCIUM 40 MILLIGRAM(S): 80 TABLET, FILM COATED ORAL at 22:27

## 2017-11-04 NOTE — PROGRESS NOTE ADULT - SUBJECTIVE AND OBJECTIVE BOX
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 She has been on Xarelto for her chronic A.Fib abd she is very well compliant to it according to her son who helps us translate her language. She denied any new symptoms. The symptoms of slurred speech has been resolved except very mild tongue numbness.    Allergies    No Known Allergies    Intolerances      Social History: [ ] Tobacco, [ ] Alcohol  none      MEDICATIONS  (STANDING):  atorvastatin 40 milliGRAM(s) Oral at bedtime  fenofibrate Tablet 145 milliGRAM(s) Oral daily  heparin  Infusion.  Unit(s)/Hr (6 mL/Hr) IV Continuous <Continuous>  metoprolol     tartrate 25 milliGRAM(s) Oral two times a day  pantoprazole    Tablet 40 milliGRAM(s) Oral before breakfast  warfarin 5 milliGRAM(s) Oral once        Review of Systems:  General: [ ] None, [ ] chills, [ ]fatigue, [ ] fevers  Skin: [ ] None, [ ] rash   HEENT: [ ] None, [ ] head injury, [ ] blurred vision, [ ] double vision, [ ] eye pain, [ ] visual loss, [ ] hearing loss, [ ] deafness, [ ] ear pain, [ ] ringing in the ears, [ ] vertigo, [ ] sinus pain, [ ] voice changes  Neck: [ ] None, [ ] neck stiffness  Respiratory: [ ] None, [ ] cough, [ ] difficulty breathing  Cardiovascular: [ ] None, [ ] calf cramps, [ ] chest pain, [ ] leg pain, [ ] swelling, [ ] rapid heart rate, [ ] shortness of breath  Gastrointestinal: [ ] None, [ ] abdominal pain, [ ] nausea, [ ] vomiting  Musculoskeletal: [ ] None, [ ] back pain, [ ] joint pain, [ ] joint stiffness, [ ] leg cramps, [ ] muscle atrophy, [ ] muscle cramps, [ ] muscle weakness, [ ] swelling of extremities  Neurological: [ ] None, [ ] Dizziness, [ ] decreased memory, [ ] fainting, [ ] focal neurological symptoms, [ ] headaches, [ ] incontinence of stool, [ ] incontinence of urine, [ ] loss of consciousness, [x ] numbness(tongue), [ ] seizures, [ ] spinning sensation, [ ] stroke, [ ] trouble walking, [ ] unsteadiness, [ ] visual changes, [ ] weakness  Psychiatric: [ ] None,  [ ] depression, [ ] anxiety, [ ] hallucinations, [ ] inability to concentrate, [ ] mood changes, [ ] panic attacks  Hematology: [ ] None,  [ ] blood clots, [ ] spontaneous bleeding    [ x] None except marked above    Vital Signs  Vital Signs Last 24 Hrs  T(C): 36.5 (04 Nov 2017 05:07), Max: 36.9 (03 Nov 2017 13:00)  T(F): 97.7 (04 Nov 2017 05:07), Max: 98.5 (03 Nov 2017 13:00)  HR: 75 (04 Nov 2017 05:07) (75 - 84)  BP: 148/73 (04 Nov 2017 05:07) (123/76 - 148/73)  BP(mean): --  RR: 16 (04 Nov 2017 05:07) (16 - 16)  SpO2: 100% (04 Nov 2017 05:07) (98% - 100%)      Neurological Exam:    Mental Status -  Alert, Awake  Fund of Knowledge – normal  Affect- Appropriate  Recent Memory – Normal, Memory loss [ ] Mild, [ ] Moderate, [ ] Severe  Remote Memory – Normal  Attention Span – Normal  Concentration –  Normal  Cognitive function – Normal  Speech – Normal  Thought content/perception – Normal    Cranial Nerves:  II Optic: Visual acuity – Bilateral - Normal ; Visual fields – normal ; Fundi – Bilateral – no optic atrophy or Papilledema  III Oculomotor – normal bilaterally  IV Trochlear – Bilateral – normal  V Trigeminal: Ophthalmic – Bilateral – normal  ;   Maxillary – Bilateral- normal ;  Mandibular – Bilateral - normal  VI Abducens – Bilateral - normal  VII Facial: Normal bilaterally  VIII Acoustic - Bilateral – hearing normal and (hearing tested by finger rub)  IX Glossopharyngeal / X Vagus: Uvula – normal  XI Accessory: normal shoulder shrug  XII Hypoglossal – bilaterally normal  Eye Movements: Gaze – Bilateral - normal    Nystagmus – Bilateral – none  Motor:  Bulk and Contour: normal  Tone: normal  Strength:                                                                        Delt           Bicep      Tricep                                                Upper Extremity: Right           5/5            5/5          5/5              5/5                                                                       Left             5/5            5/5          5/5              5/5                                                                                             HF              KE            KF                DF                                         Lower extremity: Right         5/5             5/5          5/5              5/5                                                                          Left           5/5             5/5          5/5              5/5  General Assessment of Reflexes: Right Wrist - 2+  ;  Left Wrist - 2+ ; Right Elbow - 2+ ;  Left Elbow - 2+ ;  Right Knee - 2+ ;  Left Knee - 2+ ;   Right Ankle – 2+ ; Left Ankle – 2+  Plantar Reflexes( Babinski) (L4-S2) – Bilateral – Flexion  Sensory:  Light Touch: Intact – globally  Pain: Intact – globally  Temperature: Intact – globally  Coordination – no impairment of heel-to-shin, impairment of finger-to-nose or impairment of rapid alternating movement  Gait – Normal tandem walking, normal heel walking and normal toes walking  Romberg’s sign: - Normal

## 2017-11-04 NOTE — PROGRESS NOTE ADULT - PROBLEM SELECTOR PLAN 4
-Improve score 2, given age and immobilisation.  -Heparin sub q -Improve score 2, given age and immobilization.  -Heparin/ Coumadin

## 2017-11-04 NOTE — PROGRESS NOTE ADULT - ASSESSMENT
84 y/o [place] female ( ID [id number]) with significant PMHx of HTN, A fib (on Xarelto) was brought to Ed by family for slurred speech x yesterday morning.     Pt currently feeling well and ambulating without assistance  MRI positive for lacunar infarct.  Pt currently on aspirin and statin.  Will continue to monitor, follow up tte, and speech/swallow eval. 84 y/o [place] female ( ID [id number]) with significant PMHx of HTN, A fib (on Xarelto) was brought to Ed by family for slurred speech x yesterday morning.     Pt currently feeling well and ambulating without assistance.  Focal deficits improved.   MRI positive for lacunar infarct.  Pt currently on Heparin drip and statin.  Will continue to monitor, follow up tte.

## 2017-11-04 NOTE — PROGRESS NOTE ADULT - SUBJECTIVE AND OBJECTIVE BOX
CHIEF COMPLAINT: Patient is a 85 yr old  Female who presents with a chief complaint of Slurring of speech.Pt appears comfortable.    	  REVIEW OF SYSTEMS:  CONSTITUTIONAL: No fever, weight loss, or fatigue  EYES: No eye pain, visual disturbances, or discharge  ENT:  No difficulty hearing, tinnitus, vertigo; No sinus or throat pain  NECK: No pain or stiffness  RESPIRATORY: No cough, wheezing, chills or hemoptysis; No Shortness of Breath  CARDIOVASCULAR: No chest pain, palpitations, passing out, dizziness, or leg swelling  GASTROINTESTINAL: No abdominal or epigastric pain. No nausea, vomiting, or hematemesis; No diarrhea or constipation. No melena or hematochezia.  GENITOURINARY: No dysuria, frequency, hematuria, or incontinence  NEUROLOGICAL: No headaches, memory loss, loss of strength, numbness, or tremors  SKIN: No itching, burning, rashes, or lesions   LYMPH Nodes: No enlarged glands  ENDOCRINE: No heat or cold intolerance; No hair loss  MUSCULOSKELETAL: No joint pain or swelling; No muscle, back, or extremity pain  PSYCHIATRIC: No depression, anxiety, mood swings, or difficulty sleeping  HEME/LYMPH: No easy bruising, or bleeding gums  ALLERGY AND IMMUNOLOGIC: No hives or eczema	      PHYSICAL EXAM:  T(C): 36.5 (11-04-17 @ 05:07), Max: 36.9 (11-03-17 @ 13:00)  HR: 75 (11-04-17 @ 05:07) (75 - 84)  BP: 148/73 (11-04-17 @ 05:07) (123/76 - 148/73)  RR: 16 (11-04-17 @ 05:07) (16 - 16)  SpO2: 100% (11-04-17 @ 05:07) (98% - 100%)      Appearance: Normal	  HEENT:   Normal oral mucosa, PERRL, EOMI	  Lymphatic: No lymphadenopathy  Cardiovascular: Normal S1 S2, No JVD, No murmurs, No edema  Respiratory: Lungs clear to auscultation	  Psychiatry: A & O x 3, Mood & affect appropriate  Gastrointestinal:  Soft, Non-tender, + BS	  Skin: No rashes, No ecchymoses, No cyanosis	  Neurologic: Non-focal  Extremities: Normal range of motion, No clubbing, cyanosis or edema  Vascular: Peripheral pulses palpable 2+ bilaterally    MEDICATIONS  (STANDING):  atorvastatin 40 milliGRAM(s) Oral at bedtime  fenofibrate Tablet 145 milliGRAM(s) Oral daily  metoprolol     tartrate 25 milliGRAM(s) Oral two times a day  rivaroxaban 15 milliGRAM(s) Oral every 24 hours      TELEMETRY: 	  afib  	  LABS:	 	    CARDIAC MARKERS:  CARDIAC MARKERS ( 03 Nov 2017 02:49 )  <0.015 ng/mL / x     / 87 U/L / x     / <1.0 ng/mL  CARDIAC MARKERS ( 02 Nov 2017 19:13 )  <0.015 ng/mL / x     / 100 U/L / x     / 1.3 ng/mL  CARDIAC MARKERS ( 02 Nov 2017 18:08 )  hemolzyed The new reference range for Troponin-I performed on the Siemens Vista  system is 0.015-0.045 ng/mL, which includes the 99th percentile of a  healthy reference population. Studies have shown that elevated troponin  levels above the 99th percentile cutoff are associated with an increased  risk for adverse cardiac events, with the risk increasing as troponin  levels increase. As per a joint committee of the American College of  Cardiology and European Society of Cardiology, diagnosis of classic MI is  based upon the detection of a rise or fall of cardiac troponin values,  with at least one value above the 99th percentile upper reference limit,  in the appropriate clinical context.  Troponin-I (ng/mL) Interpretation  0.00-0.045 Normal range (includes the 99th percentile of a healthy  reference population)  >0.045 Elevated troponin level indicating increased risk  Note: Troponin-I and Troponin-T cannot be used interchangeably in serial  measurements. Minimally elevated Troponin results should be interpreted  in the context of clinical findings and risk factors. ng/mL / x     / hemolzyed notified thu zuñiga 11/02/17 18:37 U/L / x     / hemolzyed ng/mL                                12.2   5.3   )-----------( 184      ( 04 Nov 2017 07:20 )             38.3     11-04    145  |  111<H>  |  26<H>  ----------------------------<  89  3.7   |  29  |  1.06    Ca    8.6      04 Nov 2017 07:20  Phos  3.1     11-04  Mg     2.3     11-04        Lipid Profile: Cholesterol 105  LDL 41  HDL 51  TG 63    HgA1c: Hemoglobin A1C, Whole Blood: 5.4 % (11-03 @ 09:31)    TSH: Thyroid Stimulating Hormone, Serum: 6.49 uU/mL (11-03 @ 06:39)    MRI:    IMPRESSION:  Acute/subacute lacunar infarct in the right corona radiata/centrum   semiovale.    Patent vessels of the [place] of [place] without evidence of aneurysm,   dissection or hemodynamically significant stenosis.    Findings were discussed with Dr. Duron on 11/3/2017 3:10 PM with   readback.  	  Echocardiogram:    CONCLUSIONS:  1. Normal mitral valve. Moderate mitral regurgitation.  2. Calcified trileaflet aortic valve with decreased  opening. Peak transaortic valve gradient equals 21.7 mm Hg,  mean transaortic valve gradient equals 12 mm Hg, consistent  with mild aortic stenosis. Mild aortic insufficiency.  3. Aortic Root: 2.7 cm.  4. Severe left atrial enlargement.  5. Normal left ventricular internal dimensions and wall  thicknesses.  6. Normal Left Ventricular Systolic Function,(EF =  50-55%)  7. Grade II diastolic dysfunction.  8. Moderate right atrial enlargement.  9. Normal right ventricular size and function.  10. RV systolic pressure is mildly increased at  42 mm Hg.  11. There is severe tricuspid regurgitation.  12. There is mild pulmonic regurgitation.  13. Small pericardial effusion.

## 2017-11-04 NOTE — PROGRESS NOTE ADULT - PROBLEM SELECTOR PLAN 3
-Patient is on Xarelto for AC and on Toprol for rate control.   -Decrease metoprolol to 25 BID, for rate control and allow permissive htn.  -Continue Xarelto 15mg daily (dose appropriate according to GFR) -Patient is on Xarelto for AC and on Toprol for rate control.   -Decrease metoprolol to 25 BID, for rate control and allow permissive htn.  -Will start Heparin Drip for AC while bridging with coumadin

## 2017-11-04 NOTE — PROGRESS NOTE ADULT - PROBLEM SELECTOR PLAN 1
-Patient admitted to tele with concern for stroke  -MRI pos for acute lacunar stroke to the rt corona radiata  -EKG: A fib at 82 bpm  -c/w aspirin, statin  -c/w BP meds as symptoms started over 48hrs prior  -Follow Echo  -f/u speech and swallow eval   -neuro checks q 4  -Cardio Dr Knight.   -Neurology: Dr Damon -Patient admitted to tele with concern for stroke  -MRI pos for acute lacunar stroke to the rt corona radiata  -EKG: A fib at 82 bpm  -c/w Heparin, statin  -c/w BP meds as symptoms started over 48hrs prior  -Follow Echo  -f/u speech and swallow eval   -neuro checks q 4  c/w w/ heparin drip while bridging with warfarin  -Cardio Dr Knight.   -Neurology: Dr Damon

## 2017-11-04 NOTE — PROGRESS NOTE ADULT - SUBJECTIVE AND OBJECTIVE BOX
PGY 1 Note discussed with supervising resident and primary attending    Patient is a 85y old  Female who presents with a chief complaint of Slurring of speech (02 Nov 2017 21:22)    INTERVAL HPI/OVERNIGHT EVENTS:   No acute events reported overnight.    Today pt presents in no acute distress.  Pt found resting comfortably in bed. pt continue to exude slurred speech and mild rt lip droop.  Symptoms dont seem to be currently evolving.       MEDICATIONS  (STANDING):  aspirin  chewable 81 milliGRAM(s) Oral daily  atorvastatin 40 milliGRAM(s) Oral at bedtime  fenofibrate Tablet 145 milliGRAM(s) Oral daily  metoprolol     tartrate 25 milliGRAM(s) Oral two times a day  rivaroxaban 15 milliGRAM(s) Oral every 24 hours      __________________________________________________  REVIEW OF SYSTEMS:    CONSTITUTIONAL: No fever,   EYES: no acute visual disturbances  NECK: No pain or stiffness  RESPIRATORY: No cough; No shortness of breath  CARDIOVASCULAR: No chest pain, no palpitations  GASTROINTESTINAL: No pain. No nausea or vomiting; No diarrhea   NEUROLOGICAL: No headache or numbness, no tremors; Rt facial weakness  MUSCULOSKELETAL: No joint pain, no muscle pain  GENITOURINARY: no dysuria, no frequency, no hesitancy  PSYCHIATRY: no depression , no anxiety  ALL OTHER  ROS negative        Vital Signs Last 24 Hrs  T(C): 36.9 (03 Nov 2017 13:00), Max: 37.1 (02 Nov 2017 16:29)  T(F): 98.5 (03 Nov 2017 13:00), Max: 98.7 (02 Nov 2017 16:29)  HR: 84 (03 Nov 2017 13:00) (70 - 93)  BP: 129/70 (03 Nov 2017 13:00) (129/70 - 155/89)  BP(mean): --  RR: 16 (03 Nov 2017 13:00) (16 - 18)  SpO2: 100% (03 Nov 2017 13:00) (98% - 100%)    ________________________________________________  PHYSICAL EXAM:  GENERAL: NAD  HEENT: Normocephalic;  conjunctivae and sclerae clear; moist mucous membranes;   NECK : supple  CHEST/LUNG: Clear to auscultation bilaterally with good air entry   HEART: S1 S2  regular; no murmurs, gallops or rubs  ABDOMEN: Soft, Nontender, Nondistended; Bowel sounds present  EXTREMITIES: no cyanosis; no edema; no calf tenderness  NERVOUS SYSTEM:  Awake and alert; Oriented  to place, person and time ; Rt lower lip droop, and tongue deviation to the left.  Slurred speech.  No further focal neurological deficits.     _________________________________________________  LABS:                        12.0   5.3   )-----------( 179      ( 03 Nov 2017 06:39 )             37.0     11-03    142  |  108  |  25<H>  ----------------------------<  78  3.6   |  26  |  1.05    Ca    8.5      03 Nov 2017 06:39  Phos  3.0     11-03  Mg     2.2     11-03      PT/INR - ( 02 Nov 2017 17:44 )   PT: 15.2 sec;   INR: 1.39 ratio         PTT - ( 02 Nov 2017 17:44 )  PTT:42.0 sec    CAPILLARY BLOOD GLUCOSE      POCT Blood Glucose.: 82 mg/dL (02 Nov 2017 16:55)        RADIOLOGY & ADDITIONAL TESTS:    Imaging Personally Reviewed:  YES  Consultant(s) Notes Reviewed:   YES    Care Discussed with Consultants :     Plan of care was discussed with patient and /or primary care giver; all questions and concerns were addressed and care was aligned with patient's wishes. PGY 1 Note discussed with supervising resident and primary attending    Patient is a 85y old  Female who presents with a chief complaint of Slurring of speech (02 Nov 2017 21:22)    INTERVAL HPI/OVERNIGHT EVENTS:   No acute events reported overnight.    Today pt presents in no acute distress.  Pt found resting comfortably in bed. Pt feeling better.  Facial droop has subsided, speech is improved and pt is tolerating solid foods.  No complaints today.    MEDICATIONS  (STANDING):  atorvastatin 40 milliGRAM(s) Oral at bedtime  fenofibrate Tablet 145 milliGRAM(s) Oral daily  heparin  Infusion.  Unit(s)/Hr (6 mL/Hr) IV Continuous <Continuous>  metoprolol     tartrate 25 milliGRAM(s) Oral two times a day  pantoprazole    Tablet 40 milliGRAM(s) Oral before breakfast  warfarin 5 milliGRAM(s) Oral once    ___________________________________  REVIEW OF SYSTEMS:    CONSTITUTIONAL: No fever,   EYES: no acute visual disturbances  NECK: No pain or stiffness  RESPIRATORY: No cough; No shortness of breath  CARDIOVASCULAR: No chest pain, no palpitations  GASTROINTESTINAL: No pain. No nausea or vomiting; No diarrhea   NEUROLOGICAL: No headache or numbness, no tremors; Rt facial weakness  MUSCULOSKELETAL: No joint pain, no muscle pain  GENITOURINARY: no dysuria, no frequency, no hesitancy  PSYCHIATRY: no depression , no anxiety  ALL OTHER  ROS negative        Vital Signs Last 24 Hrs  T(C): 36.8 (04 Nov 2017 11:25), Max: 36.8 (04 Nov 2017 11:25)  T(F): 98.2 (04 Nov 2017 11:25), Max: 98.2 (04 Nov 2017 11:25)  HR: 76 (04 Nov 2017 11:25) (75 - 76)  BP: 142/86 (04 Nov 2017 11:25) (123/76 - 148/73)  BP(mean): --  RR: 16 (04 Nov 2017 11:25) (16 - 16)  SpO2: 99% (04 Nov 2017 11:25) (98% - 100%)  ________________________________________________  PHYSICAL EXAM:  GENERAL: NAD  HEENT: Normocephalic;  conjunctivae and sclerae clear; moist mucous membranes;   NECK : supple  CHEST/LUNG: Clear to auscultation bilaterally with good air entry   HEART: S1 S2  regular; no murmurs, gallops or rubs  ABDOMEN: Soft, Nontender, Nondistended; Bowel sounds present  EXTREMITIES: no cyanosis; no edema; no calf tenderness  NERVOUS SYSTEM:  Awake and alert; Oriented  to place, person and time ;   _________________________________________________  LABS:                                   12.2   5.3   )-----------( 184      ( 04 Nov 2017 07:20 )             38.3     11-04    145  |  111<H>  |  26<H>  ----------------------------<  89  3.7   |  29  |  1.06    Ca    8.6      04 Nov 2017 07:20  Phos  3.1     11-04  Mg     2.3     11-04        PT/INR - ( 02 Nov 2017 17:44 )   PT: 15.2 sec;   INR: 1.39 ratio         PTT - ( 02 Nov 2017 17:44 )  PTT:42.0 sec    CAPILLARY BLOOD GLUCOSE      POCT Blood Glucose.: 82 mg/dL (02 Nov 2017 16:55)        RADIOLOGY & ADDITIONAL TESTS:    Imaging Personally Reviewed:  YES  Consultant(s) Notes Reviewed:   YES    Care Discussed with Consultants :     Plan of care was discussed with patient and /or primary care giver; all questions and concerns were addressed and care was aligned with patient's wishes.

## 2017-11-04 NOTE — PROGRESS NOTE ADULT - PROBLEM SELECTOR PLAN 1
Stop Xarelto  Start on IV Heparin without bolus and PO coumadin as discussed with her cardiologist  PT/OT/speech pankaj  Statin

## 2017-11-04 NOTE — PROGRESS NOTE ADULT - ASSESSMENT
85 yr old  [facility] with significant PMHx of HTN, A fib (on Xarelto was brought to [facility] by family for slurred speech x yesterday morning.   1.Neurology f/u  2.MRI-+ CVA, probable xarelto failure rec IV heparn drip and coumadin if cleared by neurology  3.Afib-lopressor.  4.Statin.  5.PPI.  6.Stress test in 4-6 weeks.

## 2017-11-04 NOTE — PROGRESS NOTE ADULT - ATTENDING COMMENTS
85-year-old woman with h/o Chronic A.Fib and on Xarelto with very well Compliant to it admitted with symptoms of slurred speech and paresthesia at the tip of the tongue. Her MRI-brain shows right corona radiata stroke. MRA-head and Neck are normal. She will have 2D Echo and we are going to stop her Xarelto and starting her on IV Heparin and PO coumadin with goal of INR is between 2 and 3. Her son agreed with the plan. She will also get PT/OT and speech evaluation as well.

## 2017-11-04 NOTE — PROGRESS NOTE ADULT - PROBLEM SELECTOR PLAN 2
-Holding PO medication to allow permissive hypertension up to   -Restart as appropriate. -c/w metoprolol, Norvasc- will resume remaining meds as needed- Losartan

## 2017-11-04 NOTE — PROGRESS NOTE ADULT - SUBJECTIVE AND OBJECTIVE BOX
Patient is a 85y old  Female who presents with a chief complaint of Slurring of speech (02 Nov 2017 21:22)    pt seen in tele [x  ], reg med floor [   ], bed [x  ], chair at bedside [   ], a+o x3 [ x ], lethargic [  ],    nad [x  ]    Allergies    No Known Allergies      Vitals    T(F): 97.7 (11-04-17 @ 05:07), Max: 98.5 (11-03-17 @ 13:00)  HR: 75 (11-04-17 @ 05:07) (75 - 84)  BP: 148/73 (11-04-17 @ 05:07) (123/76 - 148/73)  RR: 16 (11-04-17 @ 05:07) (16 - 16)  SpO2: 100% (11-04-17 @ 05:07) (98% - 100%)  Wt(kg): --  CAPILLARY BLOOD GLUCOSE      POCT Blood Glucose.: 82 mg/dL (02 Nov 2017 16:55)      Labs                          12.0   5.3   )-----------( 179      ( 03 Nov 2017 06:39 )             37.0       11-04    145  |  111<H>  |  26<H>  ----------------------------<  89  3.7   |  29  |  1.06    Ca    8.6      04 Nov 2017 07:20  Phos  3.1     11-04  Mg     2.3     11-04        CARDIAC MARKERS ( 03 Nov 2017 02:49 )  <0.015 ng/mL / x     / 87 U/L / x     / <1.0 ng/mL  CARDIAC MARKERS ( 02 Nov 2017 19:13 )  <0.015 ng/mL / x     / 100 U/L / x     / 1.3 ng/mL  CARDIAC MARKERS ( 02 Nov 2017 18:08 )  hemolzyed The new reference range for Troponin-I performed on the Siemens Vista  system is 0.015-0.045 ng/mL, which includes the 99th percentile of a  healthy reference population. Studies have shown that elevated troponin  levels above the 99th percentile cutoff are associated with an increased  risk for adverse cardiac events, with the risk increasing as troponin  levels increase. As per a joint committee of the American College of  Cardiology and European Society of Cardiology, diagnosis of classic MI is  based upon the detection of a rise or fall of cardiac troponin values,  with at least one value above the 99th percentile upper reference limit,  in the appropriate clinical context.  Troponin-I (ng/mL) Interpretation  0.00-0.045 Normal range (includes the 99th percentile of a healthy  reference population)  >0.045 Elevated troponin level indicating increased risk  Note: Troponin-I and Troponin-T cannot be used interchangeably in serial  measurements. Minimally elevated Troponin results should be interpreted  in the context of clinical findings and risk factors. ng/mL / x     / hemolzyed notified thu zuñiga 11/02/17 18:37 U/L / x     / hemolzyed ng/mL            Radiology Results      Meds    MEDICATIONS  (STANDING):  aspirin  chewable 81 milliGRAM(s) Oral daily  atorvastatin 40 milliGRAM(s) Oral at bedtime  fenofibrate Tablet 145 milliGRAM(s) Oral daily  metoprolol     tartrate 25 milliGRAM(s) Oral two times a day  rivaroxaban 15 milliGRAM(s) Oral every 24 hours      MEDICATIONS  (PRN):      Physical Exam    Neuro :  no focal deficits  Respiratory: CTA B/L  CV: RRR, S1S2, no murmurs,   Abdominal: Soft, NT, ND +BS,  Extremities: No edema, + peripheral pulses    ASSESSMENT    Slurred speech  possible tia  r/o cva  h/o HTN (hypertension)  A fib   No significant past surgical history      PLAN    f/u mri/ mra  neuro cons  cardio cons  cardiac enzymes x 2 neg  cont current meds Patient is a 85y old  Female who presents with a chief complaint of Slurring of speech (02 Nov 2017 21:22)    pt seen in tele [x  ], reg med floor [   ], sitting on bed [x  ], chair at bedside [   ], a+o x3 [ x ], lethargic [  ],    nad [x  ]    Allergies    No Known Allergies      Vitals    T(F): 97.7 (11-04-17 @ 05:07), Max: 98.5 (11-03-17 @ 13:00)  HR: 75 (11-04-17 @ 05:07) (75 - 84)  BP: 148/73 (11-04-17 @ 05:07) (123/76 - 148/73)  RR: 16 (11-04-17 @ 05:07) (16 - 16)  SpO2: 100% (11-04-17 @ 05:07) (98% - 100%)  Wt(kg): --  CAPILLARY BLOOD GLUCOSE      POCT Blood Glucose.: 82 mg/dL (02 Nov 2017 16:55)      Labs                          12.0   5.3   )-----------( 179      ( 03 Nov 2017 06:39 )             37.0       11-04    145  |  111<H>  |  26<H>  ----------------------------<  89  3.7   |  29  |  1.06    Ca    8.6      04 Nov 2017 07:20  Phos  3.1     11-04  Mg     2.3     11-04        CARDIAC MARKERS ( 03 Nov 2017 02:49 )  <0.015 ng/mL / x     / 87 U/L / x     / <1.0 ng/mL  CARDIAC MARKERS ( 02 Nov 2017 19:13 )  <0.015 ng/mL / x     / 100 U/L / x     / 1.3 ng/mL  CARDIAC MARKERS ( 02 Nov 2017 18:08 )  hemolzyed The new reference range for Troponin-I performed on the Siemens Vista  system is 0.015-0.045 ng/mL, which includes the 99th percentile of a  healthy reference population. Studies have shown that elevated troponin  levels above the 99th percentile cutoff are associated with an increased  risk for adverse cardiac events, with the risk increasing as troponin  levels increase. As per a joint committee of the American College of  Cardiology and European Society of Cardiology, diagnosis of classic MI is  based upon the detection of a rise or fall of cardiac troponin values,  with at least one value above the 99th percentile upper reference limit,  in the appropriate clinical context.  Troponin-I (ng/mL) Interpretation  0.00-0.045 Normal range (includes the 99th percentile of a healthy  reference population)  >0.045 Elevated troponin level indicating increased risk  Note: Troponin-I and Troponin-T cannot be used interchangeably in serial  measurements. Minimally elevated Troponin results should be interpreted  in the context of clinical findings and risk factors. ng/mL / x     / hemolzyed notified thu zuñiga 11/02/17 18:37 U/L / x     / hemolzyed ng/mL      Radiology Results    < from: MRA Head w/o Cont (11.03.17 @ 14:58) >  IMPRESSION:  Acute/subacute lacunar infarct in the right corona radiata/centrum   semiovale.    Patent vessels of the [place] of [place] without evidence of aneurysm,   dissection or hemodynamically significant stenosis.      < end of copied text >        Meds    MEDICATIONS  (STANDING):  aspirin  chewable 81 milliGRAM(s) Oral daily  atorvastatin 40 milliGRAM(s) Oral at bedtime  fenofibrate Tablet 145 milliGRAM(s) Oral daily  metoprolol     tartrate 25 milliGRAM(s) Oral two times a day  rivaroxaban 15 milliGRAM(s) Oral every 24 hours      MEDICATIONS  (PRN):      Physical Exam    Neuro :  no focal deficits  Respiratory: CTA B/L  CV: RRR, S1S2, no murmurs,   Abdominal: Soft, NT, ND +BS,  Extremities: No edema, + peripheral pulses    ASSESSMENT    s/p Slurred speech 2nd to acute/ subacute lacunar infarct in the right corona radiata/centrum semiovale.  h/o HTN (hypertension)  A fib   No significant past surgical history      PLAN    mri/ mra result with acute/ subacute cva noted above  neuro f/u  may need to d/c noac and start coumadin with lovenox bridge depending recs by neuro on f/u  cardio f/u  cardiac enzymes x 2 neg  cont current meds

## 2017-11-04 NOTE — PROGRESS NOTE ADULT - ASSESSMENT
1) Right brain stroke? embolic with h/o chronic A.Fib and very well complaint to Xarelto.  2) Chronic A.Fib  3) Failed Xarelto

## 2017-11-05 LAB
ANION GAP SERPL CALC-SCNC: 7 MMOL/L — SIGNIFICANT CHANGE UP (ref 5–17)
APTT BLD: 190.7 SEC — CRITICAL HIGH (ref 27.5–37.4)
APTT BLD: 60.5 SEC — HIGH (ref 27.5–37.4)
APTT BLD: 61.3 SEC — HIGH (ref 27.5–37.4)
BUN SERPL-MCNC: 25 MG/DL — HIGH (ref 7–18)
CALCIUM SERPL-MCNC: 8.5 MG/DL — SIGNIFICANT CHANGE UP (ref 8.4–10.5)
CHLORIDE SERPL-SCNC: 109 MMOL/L — HIGH (ref 96–108)
CO2 SERPL-SCNC: 28 MMOL/L — SIGNIFICANT CHANGE UP (ref 22–31)
CREAT SERPL-MCNC: 1.02 MG/DL — SIGNIFICANT CHANGE UP (ref 0.5–1.3)
GLUCOSE SERPL-MCNC: 89 MG/DL — SIGNIFICANT CHANGE UP (ref 70–99)
HCT VFR BLD CALC: 42.4 % — SIGNIFICANT CHANGE UP (ref 34.5–45)
HGB BLD-MCNC: 13.3 G/DL — SIGNIFICANT CHANGE UP (ref 11.5–15.5)
INR BLD: 1.17 RATIO — HIGH (ref 0.88–1.16)
MAGNESIUM SERPL-MCNC: 2.2 MG/DL — SIGNIFICANT CHANGE UP (ref 1.6–2.6)
MCHC RBC-ENTMCNC: 30.9 PG — SIGNIFICANT CHANGE UP (ref 27–34)
MCHC RBC-ENTMCNC: 31.3 GM/DL — LOW (ref 32–36)
MCV RBC AUTO: 98.6 FL — SIGNIFICANT CHANGE UP (ref 80–100)
PHOSPHATE SERPL-MCNC: 2.9 MG/DL — SIGNIFICANT CHANGE UP (ref 2.5–4.5)
PLATELET # BLD AUTO: 200 K/UL — SIGNIFICANT CHANGE UP (ref 150–400)
POTASSIUM SERPL-MCNC: 3.8 MMOL/L — SIGNIFICANT CHANGE UP (ref 3.5–5.3)
POTASSIUM SERPL-SCNC: 3.8 MMOL/L — SIGNIFICANT CHANGE UP (ref 3.5–5.3)
PROTHROM AB SERPL-ACNC: 12.8 SEC — HIGH (ref 9.8–12.7)
RBC # BLD: 4.3 M/UL — SIGNIFICANT CHANGE UP (ref 3.8–5.2)
RBC # FLD: 13.5 % — SIGNIFICANT CHANGE UP (ref 10.3–14.5)
SODIUM SERPL-SCNC: 144 MMOL/L — SIGNIFICANT CHANGE UP (ref 135–145)
WBC # BLD: 6.7 K/UL — SIGNIFICANT CHANGE UP (ref 3.8–10.5)
WBC # FLD AUTO: 6.7 K/UL — SIGNIFICANT CHANGE UP (ref 3.8–10.5)

## 2017-11-05 RX ADMIN — HEPARIN SODIUM 450 UNIT(S)/HR: 5000 INJECTION INTRAVENOUS; SUBCUTANEOUS at 19:59

## 2017-11-05 RX ADMIN — WARFARIN SODIUM 5 MILLIGRAM(S): 2.5 TABLET ORAL at 21:56

## 2017-11-05 RX ADMIN — HEPARIN SODIUM 450 UNIT(S)/HR: 5000 INJECTION INTRAVENOUS; SUBCUTANEOUS at 07:19

## 2017-11-05 RX ADMIN — Medication 25 MILLIGRAM(S): at 05:28

## 2017-11-05 RX ADMIN — ATORVASTATIN CALCIUM 40 MILLIGRAM(S): 80 TABLET, FILM COATED ORAL at 21:56

## 2017-11-05 RX ADMIN — Medication 25 MILLIGRAM(S): at 18:52

## 2017-11-05 RX ADMIN — Medication 145 MILLIGRAM(S): at 16:21

## 2017-11-05 RX ADMIN — PANTOPRAZOLE SODIUM 40 MILLIGRAM(S): 20 TABLET, DELAYED RELEASE ORAL at 05:28

## 2017-11-05 NOTE — PROGRESS NOTE ADULT - SUBJECTIVE AND OBJECTIVE BOX
Patient is a 85y old  Female who presents with a chief complaint of Slurring of speech (02 Nov 2017 21:22)    pt seen in tele [x  ], reg med floor [   ], sitting on bed [x  ], chair at bedside [   ], a+o x3 [ x ], lethargic [  ],    nad [x  ]    Allergies    No Known Allergies        Vitals    T(F): 97.4 (11-05-17 @ 05:10), Max: 98.9 (11-04-17 @ 21:40)  HR: 73 (11-05-17 @ 05:10) (73 - 86)  BP: 144/84 (11-05-17 @ 05:10) (113/69 - 145/90)  RR: 17 (11-05-17 @ 05:10) (16 - 17)  SpO2: 100% (11-05-17 @ 05:10) (91% - 100%)  Wt(kg): --  CAPILLARY BLOOD GLUCOSE          Labs                          13.3   6.7   )-----------( 200      ( 05 Nov 2017 05:31 )             42.4       11-05    144  |  109<H>  |  25<H>  ----------------------------<  89  3.8   |  28  |  1.02    Ca    8.5      05 Nov 2017 05:31  Phos  2.9     11-05  Mg     2.2     11-05        Radiology Results      Meds    MEDICATIONS  (STANDING):  atorvastatin 40 milliGRAM(s) Oral at bedtime  fenofibrate Tablet 145 milliGRAM(s) Oral daily  heparin  Infusion.  Unit(s)/Hr (6 mL/Hr) IV Continuous <Continuous>  influenza   Vaccine 0.5 milliLiter(s) IntraMuscular once  metoprolol     tartrate 25 milliGRAM(s) Oral two times a day  pantoprazole    Tablet 40 milliGRAM(s) Oral before breakfast      MEDICATIONS  (PRN):      Physical Exam    Neuro :  no focal deficits  Respiratory: CTA B/L  CV: RRR, S1S2, no murmurs,   Abdominal: Soft, NT, ND +BS,  Extremities: No edema, + peripheral pulses    ASSESSMENT    s/p Slurred speech 2nd to acute/ subacute lacunar infarct in the right corona radiata/centrum semiovale.  h/o HTN (hypertension)  A fib   No significant past surgical history      PLAN    mri/ mra result with acute/ subacute cva noted above  neuro f/u  may need to d/c noac and start coumadin with lovenox bridge depending recs by neuro on f/u  cardio f/u  cardiac enzymes x 2 neg  cont current meds Patient is a 85y old  Female who presents with a chief complaint of Slurring of speech (02 Nov 2017 21:22)    pt seen in tele [x  ], reg med floor [   ], sitting on bed [x  ], chair at bedside [   ], a+o x3 [ x ], lethargic [  ],    nad [x  ], heparin drip [x]    Allergies    No Known Allergies        Vitals    T(F): 97.4 (11-05-17 @ 05:10), Max: 98.9 (11-04-17 @ 21:40)  HR: 73 (11-05-17 @ 05:10) (73 - 86)  BP: 144/84 (11-05-17 @ 05:10) (113/69 - 145/90)  RR: 17 (11-05-17 @ 05:10) (16 - 17)  SpO2: 100% (11-05-17 @ 05:10) (91% - 100%)  Wt(kg): --  CAPILLARY BLOOD GLUCOSE          Labs                          13.3   6.7   )-----------( 200      ( 05 Nov 2017 05:31 )             42.4       11-05    144  |  109<H>  |  25<H>  ----------------------------<  89  3.8   |  28  |  1.02    Ca    8.5      05 Nov 2017 05:31  Phos  2.9     11-05  Mg     2.2     11-05    Activated Partial Thromboplastin Time: 190.7: TYPE:(C=Critical, N=Notification, A=Abnormal) C  TESTS: PTT  DATE/TIME CALLED: 11/05/17 06:53  CALLED TO: INOCENTE JERNIGAN  READ BACK (2 Patient Identifiers)(Y/N): Y  READ BACK VALUES (Y/N): Y  CALLED BY: MILLICENT 11/05/17 06:53  The recommended therapeutic heparin range (full dose) is 58-99 seconds.  Recommended therapeutic Argatroban range is 1.5 to 3.0 times the baseline  APTT value, not to exceed 100 seconds. Recommended therapeutic Refludan  range is 1.5 to 2.5 times the baseline APTT. sec (11.05.17 @ 05:31)    INR: 1.17: Please note: New Critical Value: 5.0 ratio as of 1/2/14. ratio (11.05.17 @ 05:31)        Radiology Results      Meds    MEDICATIONS  (STANDING):  atorvastatin 40 milliGRAM(s) Oral at bedtime  fenofibrate Tablet 145 milliGRAM(s) Oral daily  heparin  Infusion.  Unit(s)/Hr (6 mL/Hr) IV Continuous <Continuous>  influenza   Vaccine 0.5 milliLiter(s) IntraMuscular once  metoprolol     tartrate 25 milliGRAM(s) Oral two times a day  pantoprazole    Tablet 40 milliGRAM(s) Oral before breakfast      MEDICATIONS  (PRN):      Physical Exam    Neuro :  no focal deficits  Respiratory: CTA B/L  CV: RRR, S1S2, no murmurs,   Abdominal: Soft, NT, ND +BS,  Extremities: No edema, + peripheral pulses    ASSESSMENT    s/p Slurred speech 2nd to acute/ subacute lacunar infarct in the right corona radiata/centrum semiovale.  h/o HTN (hypertension)  A fib   No significant past surgical history      PLAN    mri/ mra result with acute/ subacute cva noted above  neuro f/u  pt started coumadin with heparin drip bridge until inr 2-3  cont hep drip and adjust dosing to maintain ptt 60 to 75  cardio f/u  cardiac enzymes x 2 neg  stress test in 4-6 wks  cont current meds

## 2017-11-05 NOTE — PROGRESS NOTE ADULT - SUBJECTIVE AND OBJECTIVE BOX
CHIEF COMPLAINT:Patient is a 85y old  Female who presents with a chief complaint of Slurring of speech .Pt appears comfortable.    	  REVIEW OF SYSTEMS:  CONSTITUTIONAL: No fever, weight loss, or fatigue  EYES: No eye pain, visual disturbances, or discharge  ENT:  No difficulty hearing, tinnitus, vertigo; No sinus or throat pain  NECK: No pain or stiffness  RESPIRATORY: No cough, wheezing, chills or hemoptysis; No Shortness of Breath  CARDIOVASCULAR: No chest pain, palpitations, passing out, dizziness, or leg swelling  GASTROINTESTINAL: No abdominal or epigastric pain. No nausea, vomiting, or hematemesis; No diarrhea or constipation. No melena or hematochezia.  GENITOURINARY: No dysuria, frequency, hematuria, or incontinence  NEUROLOGICAL: No headaches, memory loss, loss of strength, numbness, or tremors  SKIN: No itching, burning, rashes, or lesions   LYMPH Nodes: No enlarged glands  ENDOCRINE: No heat or cold intolerance; No hair loss  MUSCULOSKELETAL: No joint pain or swelling; No muscle, back, or extremity pain  PSYCHIATRIC: No depression, anxiety, mood swings, or difficulty sleeping  HEME/LYMPH: No easy bruising, or bleeding gums  ALLERGY AND IMMUNOLOGIC: No hives or eczema	      PHYSICAL EXAM:  T(C): 36.3 (11-05-17 @ 05:10), Max: 37.2 (11-04-17 @ 21:40)  HR: 73 (11-05-17 @ 05:10) (73 - 86)  BP: 144/84 (11-05-17 @ 05:10) (113/69 - 145/90)  RR: 17 (11-05-17 @ 05:10) (16 - 17)  SpO2: 100% (11-05-17 @ 05:10) (91% - 100%)      Appearance: Normal	  HEENT:   Normal oral mucosa, PERRL, EOMI	  Lymphatic: No lymphadenopathy  Cardiovascular: Normal S1 S2, No JVD, No murmurs, No edema  Respiratory: Lungs clear to auscultation	  Psychiatry: A & O x 3, Mood & affect appropriate  Gastrointestinal:  Soft, Non-tender, + BS	  Skin: No rashes, No ecchymoses, No cyanosis	  Neurologic: Non-focal  Extremities: Normal range of motion, No clubbing, cyanosis or edema  Vascular: Peripheral pulses palpable 2+ bilaterally    MEDICATIONS  (STANDING):  atorvastatin 40 milliGRAM(s) Oral at bedtime  fenofibrate Tablet 145 milliGRAM(s) Oral daily  heparin  Infusion.  Unit(s)/Hr (6 mL/Hr) IV Continuous <Continuous>  influenza   Vaccine 0.5 milliLiter(s) IntraMuscular once  metoprolol     tartrate 25 milliGRAM(s) Oral two times a day  pantoprazole    Tablet 40 milliGRAM(s) Oral before breakfast      	  LABS:	 	    Prothrombin Time and INR, Plasma in AM (11.05.17 @ 05:31)    Prothrombin Time, Plasma: 12.8: Effective March 21st, the reference range for PT has changed. sec    INR: 1.17: Please note: New Critical Value: 5.0 ratio as of 1/2/14. ratio    Activated Partial Thromboplastin Time (11.05.17 @ 05:31)    Activated Partial Thromboplastin Time: 190.7: TYPE:(C=Critical, N=Notification, A=Abnormal) C  TESTS: PTT  DATE/TIME CALLED: 11/05/17 06:53  CALLED TO: INOCENTE JERNIGAN  READ BACK (2 Patient Identifiers)(Y/N): Y  READ BACK VALUES (Y/N): Y  CALLED BY: MILLICENT 11/05/17 06:53  The recommended therapeutic heparin range (full dose) is 58-99 seconds.  Recommended therapeutic Argatroban range is 1.5 to 3.0 times the baseline  APTT value, not to exceed 100 seconds. Recommended therapeutic Refludan  range is 1.5 to 2.5 times the baseline APTT. sec                         13.3   6.7   )-----------( 200      ( 05 Nov 2017 05:31 )             42.4     11-05    144  |  109<H>  |  25<H>  ----------------------------<  89  3.8   |  28  |  1.02    Ca    8.5      05 Nov 2017 05:31  Phos  2.9     11-05  Mg     2.2     11-05        Lipid Profile: Cholesterol 105  LDL 41  HDL 51  TG 63    HgA1c: Hemoglobin A1C, Whole Blood: 5.4 % (11-03 @ 09:31)    TSH: Thyroid Stimulating Hormone, Serum: 6.49 uU/mL (11-03 @ 06:39)

## 2017-11-05 NOTE — PROGRESS NOTE ADULT - ASSESSMENT
85 [facility] with significant PMHx of HTN, A fib (on Xarelto was brought to Ed by family for slurred speech x yesterday morning.   1.Neurology f/u  2.MRI-+ CVA, probable xarelto failure rec IV heparn drip and coumadin until INR>2.0.  3.Afib-lopressor.  4.Statin.  5.PPI.  6.Stress test in 4-6 weeks.

## 2017-11-06 ENCOUNTER — TRANSCRIPTION ENCOUNTER (OUTPATIENT)
Age: 82
End: 2017-11-06

## 2017-11-06 LAB
ANION GAP SERPL CALC-SCNC: 8 MMOL/L — SIGNIFICANT CHANGE UP (ref 5–17)
APTT BLD: 186 SEC — CRITICAL HIGH (ref 27.5–37.4)
APTT BLD: 48.6 SEC — HIGH (ref 27.5–37.4)
APTT BLD: >200 SEC — CRITICAL HIGH (ref 27.5–37.4)
BUN SERPL-MCNC: 22 MG/DL — HIGH (ref 7–18)
CALCIUM SERPL-MCNC: 8 MG/DL — LOW (ref 8.4–10.5)
CHLORIDE SERPL-SCNC: 110 MMOL/L — HIGH (ref 96–108)
CO2 SERPL-SCNC: 24 MMOL/L — SIGNIFICANT CHANGE UP (ref 22–31)
CREAT SERPL-MCNC: 0.83 MG/DL — SIGNIFICANT CHANGE UP (ref 0.5–1.3)
GLUCOSE SERPL-MCNC: 92 MG/DL — SIGNIFICANT CHANGE UP (ref 70–99)
HCT VFR BLD CALC: 39.1 % — SIGNIFICANT CHANGE UP (ref 34.5–45)
HCT VFR BLD CALC: 42.6 % — SIGNIFICANT CHANGE UP (ref 34.5–45)
HGB BLD-MCNC: 12.4 G/DL — SIGNIFICANT CHANGE UP (ref 11.5–15.5)
HGB BLD-MCNC: 14.1 G/DL — SIGNIFICANT CHANGE UP (ref 11.5–15.5)
INR BLD: 1.71 RATIO — HIGH (ref 0.88–1.16)
INR BLD: 1.99 RATIO — HIGH (ref 0.88–1.16)
MAGNESIUM SERPL-MCNC: 2.2 MG/DL — SIGNIFICANT CHANGE UP (ref 1.6–2.6)
MCHC RBC-ENTMCNC: 31.5 PG — SIGNIFICANT CHANGE UP (ref 27–34)
MCHC RBC-ENTMCNC: 31.7 GM/DL — LOW (ref 32–36)
MCHC RBC-ENTMCNC: 31.7 PG — SIGNIFICANT CHANGE UP (ref 27–34)
MCHC RBC-ENTMCNC: 33.1 GM/DL — SIGNIFICANT CHANGE UP (ref 32–36)
MCV RBC AUTO: 95.8 FL — SIGNIFICANT CHANGE UP (ref 80–100)
MCV RBC AUTO: 99.3 FL — SIGNIFICANT CHANGE UP (ref 80–100)
PHOSPHATE SERPL-MCNC: 2.9 MG/DL — SIGNIFICANT CHANGE UP (ref 2.5–4.5)
PLATELET # BLD AUTO: 176 K/UL — SIGNIFICANT CHANGE UP (ref 150–400)
PLATELET # BLD AUTO: 203 K/UL — SIGNIFICANT CHANGE UP (ref 150–400)
POTASSIUM SERPL-MCNC: 3.6 MMOL/L — SIGNIFICANT CHANGE UP (ref 3.5–5.3)
POTASSIUM SERPL-SCNC: 3.6 MMOL/L — SIGNIFICANT CHANGE UP (ref 3.5–5.3)
PROTHROM AB SERPL-ACNC: 18.8 SEC — HIGH (ref 9.8–12.7)
PROTHROM AB SERPL-ACNC: 22 SEC — HIGH (ref 9.8–12.7)
RBC # BLD: 3.93 M/UL — SIGNIFICANT CHANGE UP (ref 3.8–5.2)
RBC # BLD: 4.44 M/UL — SIGNIFICANT CHANGE UP (ref 3.8–5.2)
RBC # FLD: 13.5 % — SIGNIFICANT CHANGE UP (ref 10.3–14.5)
RBC # FLD: 14 % — SIGNIFICANT CHANGE UP (ref 10.3–14.5)
SODIUM SERPL-SCNC: 142 MMOL/L — SIGNIFICANT CHANGE UP (ref 135–145)
WBC # BLD: 5.7 K/UL — SIGNIFICANT CHANGE UP (ref 3.8–10.5)
WBC # BLD: 5.8 K/UL — SIGNIFICANT CHANGE UP (ref 3.8–10.5)
WBC # FLD AUTO: 5.7 K/UL — SIGNIFICANT CHANGE UP (ref 3.8–10.5)
WBC # FLD AUTO: 5.8 K/UL — SIGNIFICANT CHANGE UP (ref 3.8–10.5)

## 2017-11-06 RX ORDER — WARFARIN SODIUM 2.5 MG/1
5 TABLET ORAL ONCE
Qty: 0 | Refills: 0 | Status: DISCONTINUED | OUTPATIENT
Start: 2017-11-06 | End: 2017-11-06

## 2017-11-06 RX ORDER — WARFARIN SODIUM 2.5 MG/1
3 TABLET ORAL ONCE
Qty: 0 | Refills: 0 | Status: COMPLETED | OUTPATIENT
Start: 2017-11-06 | End: 2017-11-06

## 2017-11-06 RX ORDER — HEPARIN SODIUM 5000 [USP'U]/ML
200 INJECTION INTRAVENOUS; SUBCUTANEOUS
Qty: 25000 | Refills: 0 | Status: DISCONTINUED | OUTPATIENT
Start: 2017-11-06 | End: 2017-11-06

## 2017-11-06 RX ADMIN — HEPARIN SODIUM 200 UNIT(S)/HR: 5000 INJECTION INTRAVENOUS; SUBCUTANEOUS at 11:45

## 2017-11-06 RX ADMIN — WARFARIN SODIUM 3 MILLIGRAM(S): 2.5 TABLET ORAL at 21:29

## 2017-11-06 RX ADMIN — Medication 25 MILLIGRAM(S): at 05:26

## 2017-11-06 RX ADMIN — Medication 25 MILLIGRAM(S): at 18:21

## 2017-11-06 RX ADMIN — HEPARIN SODIUM 0 UNIT(S)/HR: 5000 INJECTION INTRAVENOUS; SUBCUTANEOUS at 02:30

## 2017-11-06 RX ADMIN — ATORVASTATIN CALCIUM 40 MILLIGRAM(S): 80 TABLET, FILM COATED ORAL at 21:29

## 2017-11-06 RX ADMIN — Medication 145 MILLIGRAM(S): at 11:45

## 2017-11-06 RX ADMIN — PANTOPRAZOLE SODIUM 40 MILLIGRAM(S): 20 TABLET, DELAYED RELEASE ORAL at 05:26

## 2017-11-06 RX ADMIN — HEPARIN SODIUM 300 UNIT(S)/HR: 5000 INJECTION INTRAVENOUS; SUBCUTANEOUS at 03:32

## 2017-11-06 NOTE — PROGRESS NOTE ADULT - SUBJECTIVE AND OBJECTIVE BOX
Patient is a 85y old  Female who presents with a chief complaint of Slurring of speech (02 Nov 2017 21:22)      pt seen in tele [x  ], reg med floor [   ], sitting on bed [x  ], chair at bedside [   ], a+o x3 [ x ], lethargic [  ],    nad [x  ], heparin drip [x]      Allergies    No Known Allergies        Vitals    T(F): 97.4 (11-06-17 @ 05:10), Max: 98.5 (11-05-17 @ 14:00)  HR: 82 (11-06-17 @ 05:10) (72 - 82)  BP: 140/86 (11-06-17 @ 05:10) (134/74 - 149/82)  RR: 17 (11-06-17 @ 05:10) (16 - 17)  SpO2: 100% (11-06-17 @ 05:10) (100% - 100%)  Wt(kg): --  CAPILLARY BLOOD GLUCOSE          Labs                          12.4   5.7   )-----------( 176      ( 06 Nov 2017 07:34 )             39.1       11-06    142  |  110<H>  |  22<H>  ----------------------------<  92  3.6   |  24  |  0.83    Ca    8.0<L>      06 Nov 2017 07:34  Phos  2.9     11-06  Mg     2.2     11-06      Activated Partial Thromboplastin Time: 186.0: TYPE:(C=Critical, N=Notification, A=Abnormal) C  TESTS: PTT  DATE/TIME CALLED: 11/06/17 08:50  CALLED TO: JORGE BROWER, RN  READ BACK (2 Patient Identifiers)(Y/N): Y  READ BACK VALUES (Y/N): Y  CALLED BY: DREW, 11/06/17 08:57  PLEASE RESUBMIT  The recommended therapeutic heparin range (full dose) is 58-99 seconds.  Recommended therapeutic Argatroban range is 1.5 to 3.0 times the baseline  APTT value, not to exceed 100 seconds. Recommended therapeutic Refludan  range is 1.5 to 2.5 times the baseline APTT. sec (11.06.17 @ 07:35)    INR: 1.99: Please note: New Critical Value: 5.0 ratio as of 1/2/14. ratio (11.06.17 @ 07:34)      Radiology Results      Meds    MEDICATIONS  (STANDING):  atorvastatin 40 milliGRAM(s) Oral at bedtime  fenofibrate Tablet 145 milliGRAM(s) Oral daily  heparin  Infusion.  Unit(s)/Hr (6 mL/Hr) IV Continuous <Continuous>  influenza   Vaccine 0.5 milliLiter(s) IntraMuscular once  metoprolol     tartrate 25 milliGRAM(s) Oral two times a day  pantoprazole    Tablet 40 milliGRAM(s) Oral before breakfast  warfarin 5 milliGRAM(s) Oral once      MEDICATIONS  (PRN):      Physical Exam    Neuro :  no focal deficits  Respiratory: CTA B/L  CV: RRR, S1S2, no murmurs,   Abdominal: Soft, NT, ND +BS,  Extremities: No edema, + peripheral pulses    ASSESSMENT    s/p Slurred speech 2nd to acute/ subacute lacunar infarct in the right corona radiata/centrum semiovale.  h/o HTN (hypertension)  A fib   No significant past surgical history      PLAN    mri/ mra result with acute/ subacute cva noted  neuro f/u   pt started coumadin with heparin drip bridge until inr 2-3  cont hep drip and adjust dosing to maintain ptt 60 to 75  f/u ptt and inr   cardio f/u  cardiac enzymes x 2 neg  stress test in 4-6 wks  cont current meds  d/c plan when inr 2-3

## 2017-11-06 NOTE — SWALLOW BEDSIDE ASSESSMENT ADULT - SLP PERTINENT HISTORY OF CURRENT PROBLEM
84 y/o female with significant PMHx of HTN, A fib (on Xarelto) was brought to Ed by family for slurred speech. Patient states that she started to have slurring of speech and went to PMD who suspected [place] Palsy, and sent patient to neurologist. Neurologist sent Pt to ED for suspicion of stroke. Focal deficits improved.  MRI 11/3 revealed acute/subacute lacunar infarct in the right corona radiata/centrum semiovale.

## 2017-11-06 NOTE — PROGRESS NOTE ADULT - ASSESSMENT
86 y/o [place] female ( ID [id number]) with significant PMHx of HTN, A fib (on Xarelto) was brought to Ed by family for slurred speech x yesterday morning.     Pt currently feeling well and ambulating without assistance.  Focal deficits improved.   MRI positive for lacunar infarct.  Pt currently on Heparin drip and statin.  Will continue to monitor, follow up tte.

## 2017-11-06 NOTE — PROVIDER CONTACT NOTE (OTHER) - ACTION/TREATMENT ORDERED:
per dr. mulligan, restart the heparin drip at 3 ml/hr - follow the heparin nomogram until further instruction from primary care team

## 2017-11-06 NOTE — SWALLOW BEDSIDE ASSESSMENT ADULT - COMMENTS
Pt received upright in chair. A&Ox4, consistently follows commands, able to provide history, speech is clear. Facial and oral structures appear grossly symmetrical, and strength and movement WFL. No difficulty chewing/swallowing reported.

## 2017-11-06 NOTE — PHYSICAL THERAPY INITIAL EVALUATION ADULT - PERTINENT HX OF CURRENT PROBLEM, REHAB EVAL
Patient to ER due to slurred speech. Work up done. Acute/subacute lacunar infarct in the right corona radiata

## 2017-11-06 NOTE — DISCHARGE NOTE ADULT - CARE PLAN
Principal Discharge DX:	Cerebrovascular accident (CVA) due to embolism of right middle cerebral artery  Goal:	prevent reoccur, symptoms relief  Instructions for follow-up, activity and diet:	Please follow up with your primary care doctor within one week and continue to use warfarin ???, keep INR 2-3.  Secondary Diagnosis:	Atrial fibrillation  Instructions for follow-up, activity and diet:	Please follow up with your primary care doctor within one week and continue to use warfarin ???, keep INR 2-3.  Secondary Diagnosis:	HTN (hypertension)  Instructions for follow-up, activity and diet:	Please follow up with your primary care doctor within one week and continue to use home medication for HTN Principal Discharge DX:	Cerebrovascular accident (CVA) due to embolism of right middle cerebral artery  Goal:	prevent reoccur, symptoms relief  Instructions for follow-up, activity and diet:	Please continue to use warfarin 1.5mg at night every day. Please follow up with your primary care doctor within 2 days, recheck INR and dose coumadin accordingly and keep INR 2-3.  Secondary Diagnosis:	Atrial fibrillation  Instructions for follow-up, activity and diet:	Please continue to use warfarin 1.5mg at night every day. Please follow up with your primary care doctor within 2 days, recheck INR and dose coumadin accordingly and keep INR 2-3.  Secondary Diagnosis:	HTN (hypertension)  Instructions for follow-up, activity and diet:	Please follow up with your primary care doctor within one week and continue to use home medication for HTN

## 2017-11-06 NOTE — PROGRESS NOTE ADULT - SUBJECTIVE AND OBJECTIVE BOX
CHIEF COMPLAINT:Patient is a 85y old  Female who presents with a chief complaint of Slurring of speech.Pt appears comfortable.    	  REVIEW OF SYSTEMS:  CONSTITUTIONAL: No fever, weight loss, or fatigue  EYES: No eye pain, visual disturbances, or discharge  ENT:  No difficulty hearing, tinnitus, vertigo; No sinus or throat pain  NECK: No pain or stiffness  RESPIRATORY: No cough, wheezing, chills or hemoptysis; No Shortness of Breath  CARDIOVASCULAR: No chest pain, palpitations, passing out, dizziness, or leg swelling  GASTROINTESTINAL: No abdominal or epigastric pain. No nausea, vomiting, or hematemesis; No diarrhea or constipation. No melena or hematochezia.  GENITOURINARY: No dysuria, frequency, hematuria, or incontinence  NEUROLOGICAL: No headaches, memory loss, loss of strength, numbness, or tremors  SKIN: No itching, burning, rashes, or lesions   LYMPH Nodes: No enlarged glands  ENDOCRINE: No heat or cold intolerance; No hair loss  MUSCULOSKELETAL: No joint pain or swelling; No muscle, back, or extremity pain  PSYCHIATRIC: No depression, anxiety, mood swings, or difficulty sleeping  HEME/LYMPH: No easy bruising, or bleeding gums  ALLERGY AND IMMUNOLOGIC: No hives or eczema	      PHYSICAL EXAM:  T(C): 36.3 (11-06-17 @ 05:10), Max: 36.9 (11-05-17 @ 14:00)  HR: 82 (11-06-17 @ 05:10) (72 - 82)  BP: 140/86 (11-06-17 @ 05:10) (134/74 - 149/82)  RR: 17 (11-06-17 @ 05:10) (16 - 17)  SpO2: 100% (11-06-17 @ 05:10) (100% - 100%)      Appearance: Normal	  HEENT:   Normal oral mucosa, PERRL, EOMI	  Lymphatic: No lymphadenopathy  Cardiovascular: Normal S1 S2, No JVD, No murmurs, No edema  Respiratory: Lungs clear to auscultation	  Psychiatry: A & O x 3, Mood & affect appropriate  Gastrointestinal:  Soft, Non-tender, + BS	  Skin: No rashes, No ecchymoses, No cyanosis	  Neurologic: Non-focal  Extremities: Normal range of motion, No clubbing, cyanosis or edema  Vascular: Peripheral pulses palpable 2+ bilaterally    MEDICATIONS  (STANDING):  atorvastatin 40 milliGRAM(s) Oral at bedtime  fenofibrate Tablet 145 milliGRAM(s) Oral daily  heparin  Infusion.  Unit(s)/Hr (6 mL/Hr) IV Continuous <Continuous>  influenza   Vaccine 0.5 milliLiter(s) IntraMuscular once  metoprolol     tartrate 25 milliGRAM(s) Oral two times a day  pantoprazole    Tablet 40 milliGRAM(s) Oral before breakfast  warfarin 5 milliGRAM(s) Oral once      TELEMETRY: 	  afib  	  LABS:	 	                        12.4   5.7   )-----------( 176      ( 06 Nov 2017 07:34 )             39.1     11-06    142  |  110<H>  |  22<H>  ----------------------------<  92  3.6   |  24  |  0.83    Ca    8.0<L>      06 Nov 2017 07:34  Phos  2.9     11-06  Mg     2.2     11-06        Lipid Profile: Cholesterol 105  LDL 41  HDL 51  TG 63    HgA1c: Hemoglobin A1C, Whole Blood: 5.4 % (11-03 @ 09:31)    TSH: Thyroid Stimulating Hormone, Serum: 6.49 uU/mL (11-03 @ 06:39)      INR: 1.99: Please note: New Critical Value: 5.0 ratio as of 1/2/14. ratio (11.06.17 @ 07:34)

## 2017-11-06 NOTE — SWALLOW BEDSIDE ASSESSMENT ADULT - SWALLOW EVAL: DIAGNOSIS
Swallow function/safety judged to be WFL during this exam. Pt safely tolerating Reg diet, no modifications needed at this time.

## 2017-11-06 NOTE — DISCHARGE NOTE ADULT - MEDICATION SUMMARY - MEDICATIONS TO TAKE
I will START or STAY ON the medications listed below when I get home from the hospital:    losartan 100 mg oral tablet  -- 1 tab(s) by mouth once a day  -- Indication: For HTN (hypertension)    warfarin 1 mg oral tablet  -- 1.5 tab(s) by mouth once a day (at bedtime)   -- Do not take this drug if you are pregnant.  It is very important that you take or use this exactly as directed.  Do not skip doses or discontinue unless directed by your doctor.  Obtain medical advice before taking any non-prescription drugs as some may affect the action of this medication.    -- Indication: For Atrial fibrillation    fenofibrate 160 mg oral tablet  -- 1 tab(s) by mouth once a day  -- Indication: For HLD    pravastatin 10 mg oral tablet  -- 1 tab(s) by mouth once a day  -- Indication: For HLD    Toprol- mg oral tablet, extended release  -- 1 tab(s) by mouth once a day  -- Indication: For HTN (hypertension)    Norvasc 10 mg oral tablet  -- 1 tab(s) by mouth once a day  -- Indication: For HTN (hypertension)

## 2017-11-06 NOTE — PROGRESS NOTE ADULT - SUBJECTIVE AND OBJECTIVE BOX
PGY1 Note discussed with supervising resident and primary attending.    Patient is a 85y old  Female who presents with a chief complaint of Slurring of speech (06 Nov 2017 10:42)      INTERVAL HPI/OVERNIGHT EVENTS: No overnight events.    MEDICATIONS  (STANDING):  atorvastatin 40 milliGRAM(s) Oral at bedtime  fenofibrate Tablet 145 milliGRAM(s) Oral daily  influenza   Vaccine 0.5 milliLiter(s) IntraMuscular once  metoprolol     tartrate 25 milliGRAM(s) Oral two times a day  pantoprazole    Tablet 40 milliGRAM(s) Oral before breakfast  warfarin 3 milliGRAM(s) Oral once    MEDICATIONS  (PRN):      Allergies    No Known Allergies    Intolerances        REVIEW OF SYSTEMS:  CONSTITUTIONAL: No fever, weight loss, or fatigue  RESPIRATORY: No cough, wheezing, chills or hemoptysis; No shortness of breath  CARDIOVASCULAR: No chest pain, palpitations, dizziness, or leg swelling  GASTROINTESTINAL: No abdominal or epigastric pain. No nausea, vomiting, or hematemesis; No diarrhea or constipation. No melena or hematochezia.  NEUROLOGICAL: No headaches, memory loss, loss of strength, numbness, or tremors  SKIN: No itching, burning, rashes, or lesions     Vital Signs Last 24 Hrs  T(C): 36.8 (06 Nov 2017 11:08), Max: 36.9 (05 Nov 2017 14:00)  T(F): 98.3 (06 Nov 2017 11:08), Max: 98.5 (05 Nov 2017 14:00)  HR: 66 (06 Nov 2017 11:08) (66 - 82)  BP: 138/82 (06 Nov 2017 11:08) (135/72 - 149/82)  BP(mean): --  RR: 14 (06 Nov 2017 11:08) (14 - 17)  SpO2: 100% (06 Nov 2017 11:08) (100% - 100%)    PHYSICAL EXAM:  GENERAL: NAD, well-groomed, well-developed  HEAD:  Atraumatic, Normocephalic  EYES: EOMI, PERRLA, conjunctiva and sclera clear  NECK: Supple, No JVD, Normal thyroid  CHEST/LUNG: Clear to percussion bilaterally; No rales, rhonchi, wheezing, or rubs  HEART: Regular rate and rhythm; No murmurs, rubs, or gallops  ABDOMEN: Soft, Nontender, Nondistended; Bowel sounds present  NERVOUS SYSTEM:  Alert & Oriented X3, Good concentration; Motor Strength 5/5 B/L   EXTREMITIES:  2+ Peripheral Pulses, No clubbing, cyanosis, or edema  SKIN;    LABS:                        12.4   5.7   )-----------( 176      ( 06 Nov 2017 07:34 )             39.1     11-06    142  |  110<H>  |  22<H>  ----------------------------<  92  3.6   |  24  |  0.83    Ca    8.0<L>      06 Nov 2017 07:34  Phos  2.9     11-06  Mg     2.2     11-06      PT/INR - ( 06 Nov 2017 07:34 )   PT: 22.0 sec;   INR: 1.99 ratio         PTT - ( 06 Nov 2017 07:35 )  PTT:186.0 sec    CAPILLARY BLOOD GLUCOSE          RADIOLOGY & ADDITIONAL TESTS:    Imaging Personally Reviewed:  [ x] YES  [ ] NO    Consultant(s) Notes Reviewed:  [x ] YES  [ ] NO

## 2017-11-06 NOTE — PROGRESS NOTE ADULT - ASSESSMENT
85 yr old  [facility] speaking female with significant PMHx of HTN, A fib (on Xarelto was brought to Ed by family for slurred speech x yesterday morning.   1.Neurology f/u  2.MRI-+ CVA, probable xarelto failure d/c IIV heparn drip and coumadin until INR 2.0-3.0.  3.Afib-lopressor.  4.Statin.  5.PPI.  6.Stress test in 4-6 weeks.

## 2017-11-06 NOTE — DISCHARGE NOTE ADULT - PLAN OF CARE
prevent reoccur, symptoms relief Please follow up with your primary care doctor within one week and continue to use warfarin ???, keep INR 2-3. Please follow up with your primary care doctor within one week and continue to use home medication for HTN Please continue to use warfarin 1.5mg at night every day. Please follow up with your primary care doctor within 2 days, recheck INR and dose coumadin accordingly and keep INR 2-3.

## 2017-11-06 NOTE — DISCHARGE NOTE ADULT - PATIENT PORTAL LINK FT
“You can access the Follow[facility] Patient Portal, offered by [facility], by registering with the following website: [email or web address]”

## 2017-11-06 NOTE — PROGRESS NOTE ADULT - PROBLEM SELECTOR PLAN 1
-Patient admitted to tele with concern for stroke  -MRI pos for acute lacunar stroke to the rt corona radiata  -EKG: A fib at 82 bpm  -c/w  statin  -c/w BP meds as symptoms started over 48hrs prior  -Echo: EF 50-55%, GIIDD  -f/u speech and swallow eval   -f/u PT eval  -neuro checks q 4  -hold Heparin drip today, give warfarin 3mg  -monitor PT/APTT  -Cardio Dr Knight.   -Neurology: Dr Damon

## 2017-11-06 NOTE — PROGRESS NOTE ADULT - PROBLEM SELECTOR PLAN 3
-Patient is on Xarelto for AC and on Toprol for rate control.   -Decrease metoprolol to 25 BID, for rate control and allow permissive htn.  -on warfarin 3mg now

## 2017-11-06 NOTE — DISCHARGE NOTE ADULT - HOSPITAL COURSE
Patient is a 85 year old female [facility] speaking female PMHx of HTN, A fib (on Xarelto was brought to ED by family for slurred speech. Patient was admitted to telemetry as Stroke. MRI: acute lacunar stroke to the R corona radiata , Echo: ef 50-55%, grade 2 diastolic dysfunction, cardiac enzyme negative two times. Neurology consulted . Pt failed xarelto. Given heparin drip and warfarin for A fib. continue monitor PT/INR until therapeutic, INR 2-3. Patient also passed speech/swallow evaluation, and  physical therapy recommended discharge to home. Patient need stress test in 4-6 wks as Per cardiologist. Dr. Espinoza. Pt also has HTN, on lopressor, stable now.    Pt was seen and examined at bedside and vitals stable, will be discharged to home today. Discussed with Dr. Sanderson and he agreed with the discharge plan. Patient is a 85 year old female [facility] speaking female PMHx of HTN, A fib (on Xarelto was brought to ED by family for slurred speech. Patient was admitted to telemetry as Stroke. MRI: acute lacunar stroke to the R corona radiata , Echo: ef 50-55%, grade 2 diastolic dysfunction, cardiac enzyme negative two times. Neurology consulted . Pt failed xarelto. Given heparin drip and warfarin for A fib. continue monitor PT/INR until therapeutic, INR 2-3. INR today 3.3, pt will restate warfarin 1.5mg at night and  follow up with primary care doctor within 2 days, recheck INR and dose coumadin accordingly and keep INR 2-3.  Patient also passed speech/swallow evaluation, and  physical therapy recommended discharge to home. Patient need stress test in 4-6 wks as Per cardiologist. Dr. Espinoza. Pt also has HTN, on lopressor, stable now.    Pt was seen and examined at bedside and vitals stable, will be discharged to home today. Discussed with Dr. Sanderson and he agreed with the discharge plan.

## 2017-11-07 LAB
ANION GAP SERPL CALC-SCNC: 6 MMOL/L — SIGNIFICANT CHANGE UP (ref 5–17)
APTT BLD: 46.8 SEC — HIGH (ref 27.5–37.4)
BUN SERPL-MCNC: 25 MG/DL — HIGH (ref 7–18)
CALCIUM SERPL-MCNC: 8.3 MG/DL — LOW (ref 8.4–10.5)
CHLORIDE SERPL-SCNC: 110 MMOL/L — HIGH (ref 96–108)
CO2 SERPL-SCNC: 26 MMOL/L — SIGNIFICANT CHANGE UP (ref 22–31)
CREAT SERPL-MCNC: 1.02 MG/DL — SIGNIFICANT CHANGE UP (ref 0.5–1.3)
GLUCOSE BLDC GLUCOMTR-MCNC: 131 MG/DL — HIGH (ref 70–99)
GLUCOSE SERPL-MCNC: 85 MG/DL — SIGNIFICANT CHANGE UP (ref 70–99)
HCT VFR BLD CALC: 38.5 % — SIGNIFICANT CHANGE UP (ref 34.5–45)
HGB BLD-MCNC: 12.4 G/DL — SIGNIFICANT CHANGE UP (ref 11.5–15.5)
INR BLD: 3.72 RATIO — HIGH (ref 0.88–1.16)
MAGNESIUM SERPL-MCNC: 2.1 MG/DL — SIGNIFICANT CHANGE UP (ref 1.6–2.6)
MCHC RBC-ENTMCNC: 32 PG — SIGNIFICANT CHANGE UP (ref 27–34)
MCHC RBC-ENTMCNC: 32.2 GM/DL — SIGNIFICANT CHANGE UP (ref 32–36)
MCV RBC AUTO: 99.3 FL — SIGNIFICANT CHANGE UP (ref 80–100)
PHOSPHATE SERPL-MCNC: 3.4 MG/DL — SIGNIFICANT CHANGE UP (ref 2.5–4.5)
PLATELET # BLD AUTO: 166 K/UL — SIGNIFICANT CHANGE UP (ref 150–400)
POTASSIUM SERPL-MCNC: 3.9 MMOL/L — SIGNIFICANT CHANGE UP (ref 3.5–5.3)
POTASSIUM SERPL-SCNC: 3.9 MMOL/L — SIGNIFICANT CHANGE UP (ref 3.5–5.3)
PROTHROM AB SERPL-ACNC: 41.7 SEC — HIGH (ref 9.8–12.7)
RBC # BLD: 3.88 M/UL — SIGNIFICANT CHANGE UP (ref 3.8–5.2)
RBC # FLD: 13.5 % — SIGNIFICANT CHANGE UP (ref 10.3–14.5)
SODIUM SERPL-SCNC: 142 MMOL/L — SIGNIFICANT CHANGE UP (ref 135–145)
WBC # BLD: 5.2 K/UL — SIGNIFICANT CHANGE UP (ref 3.8–10.5)
WBC # FLD AUTO: 5.2 K/UL — SIGNIFICANT CHANGE UP (ref 3.8–10.5)

## 2017-11-07 RX ORDER — FONDAPARINUX SODIUM 2.5 MG/.5ML
1 INJECTION, SOLUTION SUBCUTANEOUS
Qty: 0 | Refills: 0 | COMMUNITY

## 2017-11-07 RX ADMIN — Medication 145 MILLIGRAM(S): at 13:25

## 2017-11-07 RX ADMIN — ATORVASTATIN CALCIUM 40 MILLIGRAM(S): 80 TABLET, FILM COATED ORAL at 23:13

## 2017-11-07 RX ADMIN — PANTOPRAZOLE SODIUM 40 MILLIGRAM(S): 20 TABLET, DELAYED RELEASE ORAL at 05:21

## 2017-11-07 RX ADMIN — Medication 25 MILLIGRAM(S): at 05:21

## 2017-11-07 RX ADMIN — Medication 25 MILLIGRAM(S): at 18:15

## 2017-11-07 NOTE — PROGRESS NOTE ADULT - SUBJECTIVE AND OBJECTIVE BOX
Patient is a 85y old  Female who presents with a chief complaint of Slurring of speech (06 Nov 2017 10:42)    pt seen in tele [x  ], reg med floor [   ], sitting on bed [x  ], chair at bedside [   ], a+o x3 [ x ], lethargic [  ],    nad [x  ],      Allergies    No Known Allergies        Vitals    T(F): 97.6 (11-07-17 @ 04:56), Max: 98.6 (11-06-17 @ 20:02)  HR: 71 (11-07-17 @ 04:56) (66 - 95)  BP: 142/88 (11-07-17 @ 04:56) (129/60 - 145/97)  RR: 17 (11-07-17 @ 04:56) (14 - 17)  SpO2: 99% (11-07-17 @ 04:56) (99% - 100%)  Wt(kg): --  CAPILLARY BLOOD GLUCOSE          Labs                          12.4   5.2   )-----------( 166      ( 07 Nov 2017 07:48 )             38.5       11-07    142  |  110<H>  |  25<H>  ----------------------------<  85  3.9   |  26  |  1.02    Ca    8.3<L>      07 Nov 2017 07:48  Phos  3.4     11-07  Mg     2.1     11-07                  Radiology Results      Meds    MEDICATIONS  (STANDING):  atorvastatin 40 milliGRAM(s) Oral at bedtime  fenofibrate Tablet 145 milliGRAM(s) Oral daily  influenza   Vaccine 0.5 milliLiter(s) IntraMuscular once  metoprolol     tartrate 25 milliGRAM(s) Oral two times a day  pantoprazole    Tablet 40 milliGRAM(s) Oral before breakfast      MEDICATIONS  (PRN):      Physical Exam    Neuro :  no focal deficits  Respiratory: CTA B/L  CV: RRR, S1S2, no murmurs,   Abdominal: Soft, NT, ND +BS,  Extremities: No edema, + peripheral pulses    ASSESSMENT    s/p Slurred speech 2nd to acute/ subacute lacunar infarct in the right corona radiata/centrum semiovale.  h/o HTN (hypertension)  A fib   No significant past surgical history      PLAN    mri/ mra result with acute/ subacute cva noted  neuro f/u   hold coumadin 2nd to supratherapeutic inr  hep drip d/c'd yesterday  f/u inr in am and dose coumadin accordingly   cardio f/u  cardiac enzymes x 2 neg  stress test in 4-6 wks  cont current meds  d/c plan when inr 2-3

## 2017-11-07 NOTE — PROGRESS NOTE ADULT - ASSESSMENT
84 y/o [facility] female ( ID [id number]) with significant PMHx of HTN, A fib (on Xarelto) was brought to Ed by family for slurred speech x yesterday morning. Pt was admitted for Stroke.

## 2017-11-07 NOTE — PROGRESS NOTE ADULT - SUBJECTIVE AND OBJECTIVE BOX
PGY1 Note discussed with supervising resident and primary attending.    Patient is a 85y old  Female who presents with a chief complaint of Slurring of speech (06 Nov 2017 10:42)      INTERVAL HPI/OVERNIGHT EVENTS: No overnight events.    MEDICATIONS  (STANDING):  atorvastatin 40 milliGRAM(s) Oral at bedtime  fenofibrate Tablet 145 milliGRAM(s) Oral daily  influenza   Vaccine 0.5 milliLiter(s) IntraMuscular once  metoprolol     tartrate 25 milliGRAM(s) Oral two times a day  pantoprazole    Tablet 40 milliGRAM(s) Oral before breakfast    MEDICATIONS  (PRN):      Allergies    No Known Allergies    Intolerances        REVIEW OF SYSTEMS:  CONSTITUTIONAL: No fever, weight loss, or fatigue  RESPIRATORY: No cough, wheezing, chills or hemoptysis; No shortness of breath  CARDIOVASCULAR: No chest pain, palpitations, dizziness, or leg swelling  GASTROINTESTINAL: No abdominal or epigastric pain. No nausea, vomiting, or hematemesis; No diarrhea or constipation. No melena or hematochezia.  NEUROLOGICAL: No headaches, memory loss, loss of strength, numbness, or tremors  SKIN: No itching, burning, rashes, or lesions     Vital Signs Last 24 Hrs  T(C): 36.4 (07 Nov 2017 04:56), Max: 37 (06 Nov 2017 20:02)  T(F): 97.6 (07 Nov 2017 04:56), Max: 98.6 (06 Nov 2017 20:02)  HR: 71 (07 Nov 2017 04:56) (71 - 95)  BP: 142/88 (07 Nov 2017 04:56) (129/60 - 145/97)  BP(mean): --  RR: 17 (07 Nov 2017 04:56) (14 - 17)  SpO2: 99% (07 Nov 2017 04:56) (99% - 100%)    PHYSICAL EXAM:  GENERAL: NAD, well-groomed, well-developed  HEAD:  Atraumatic, Normocephalic  EYES: EOMI, PERRLA, conjunctiva and sclera clear  NECK: Supple, No JVD, Normal thyroid  CHEST/LUNG: Clear to percussion bilaterally; No rales, rhonchi, wheezing, or rubs  HEART: Regular rate and rhythm; No murmurs, rubs, or gallops  ABDOMEN: Soft, Nontender, Nondistended; Bowel sounds present  NERVOUS SYSTEM:  Alert & Oriented X3, Good concentration; Motor Strength 5/5 B/L   EXTREMITIES:  2+ Peripheral Pulses, No clubbing, cyanosis, or edema  SKIN;    LABS:                        12.4   5.2   )-----------( 166      ( 07 Nov 2017 07:48 )             38.5     11-07    142  |  110<H>  |  25<H>  ----------------------------<  85  3.9   |  26  |  1.02    Ca    8.3<L>      07 Nov 2017 07:48  Phos  3.4     11-07  Mg     2.1     11-07      PT/INR - ( 07 Nov 2017 07:48 )   PT: 41.7 sec;   INR: 3.72 ratio         PTT - ( 07 Nov 2017 07:48 )  PTT:46.8 sec    CAPILLARY BLOOD GLUCOSE          RADIOLOGY & ADDITIONAL TESTS:    Imaging Personally Reviewed:  [x ] YES  [ ] NO    Consultant(s) Notes Reviewed:  [x ] YES  [ ] NO

## 2017-11-07 NOTE — PROGRESS NOTE ADULT - PROBLEM SELECTOR PLAN 1
-Patient admitted to tele with concern for stroke  -MRI pos for acute lacunar stroke to the rt corona radiata  -EKG: A fib at 82 bpm  -c/w  statin  -c/w BP meds as symptoms started over 48hrs prior  -Echo: EF 50-55%, GIIDD  -passed speech and swallow eval   -PT eval to home  -hold all AC today  -monitor PT/INR  -Cardio Dr Knight.   -Neurology: Dr Damon

## 2017-11-07 NOTE — PROGRESS NOTE ADULT - SUBJECTIVE AND OBJECTIVE BOX
CHIEF COMPLAINT:Patient is a 85y old  Female who presents with a chief complaint of Slurring of speech. Pt appeared comfortable.    	  REVIEW OF SYSTEMS:  CONSTITUTIONAL: No fever, weight loss, or fatigue  EYES: No eye pain, visual disturbances, or discharge  ENT:  No difficulty hearing, tinnitus, vertigo; No sinus or throat pain  NECK: No pain or stiffness  RESPIRATORY: No cough, wheezing, chills or hemoptysis; No Shortness of Breath  CARDIOVASCULAR: No chest pain, palpitations, passing out, dizziness, or leg swelling  GASTROINTESTINAL: No abdominal or epigastric pain. No nausea, vomiting, or hematemesis; No diarrhea or constipation. No melena or hematochezia.  GENITOURINARY: No dysuria, frequency, hematuria, or incontinence  NEUROLOGICAL: No headaches, memory loss, loss of strength, numbness, or tremors  SKIN: No itching, burning, rashes, or lesions   LYMPH Nodes: No enlarged glands  ENDOCRINE: No heat or cold intolerance; No hair loss  MUSCULOSKELETAL: No joint pain or swelling; No muscle, back, or extremity pain  PSYCHIATRIC: No depression, anxiety, mood swings, or difficulty sleeping  HEME/LYMPH: No easy bruising, or bleeding gums  ALLERGY AND IMMUNOLOGIC: No hives or eczema	      PHYSICAL EXAM:  T(C): 36.4 (11-07-17 @ 04:56), Max: 37 (11-06-17 @ 20:02)  HR: 71 (11-07-17 @ 04:56) (66 - 95)  BP: 142/88 (11-07-17 @ 04:56) (129/60 - 145/97)  RR: 17 (11-07-17 @ 04:56) (14 - 17)  SpO2: 99% (11-07-17 @ 04:56) (99% - 100%)      Appearance: Normal	  HEENT:   Normal oral mucosa, PERRL, EOMI	  Lymphatic: No lymphadenopathy  Cardiovascular: Normal S1 S2, No JVD, No murmurs, No edema  Respiratory: Lungs clear to auscultation	  Psychiatry: A & O x 3, Mood & affect appropriate  Gastrointestinal:  Soft, Non-tender, + BS	  Skin: No rashes, No ecchymoses, No cyanosis	  Neurologic: Non-focal  Extremities: Normal range of motion, No clubbing, cyanosis or edema  Vascular: Peripheral pulses palpable 2+ bilaterally    MEDICATIONS  (STANDING):  atorvastatin 40 milliGRAM(s) Oral at bedtime  fenofibrate Tablet 145 milliGRAM(s) Oral daily  influenza   Vaccine 0.5 milliLiter(s) IntraMuscular once  metoprolol     tartrate 25 milliGRAM(s) Oral two times a day  pantoprazole    Tablet 40 milliGRAM(s) Oral before breakfast      	  LABS:	 	                        12.4   5.2   )-----------( 166      ( 07 Nov 2017 07:48 )             38.5     11-07    142  |  110<H>  |  25<H>  ----------------------------<  85  3.9   |  26  |  1.02    Ca    8.3<L>      07 Nov 2017 07:48  Phos  3.4     11-07  Mg     2.1     11-07    Lipid Profile: Cholesterol 105  LDL 41  HDL 51  TG 63    HgA1c: Hemoglobin A1C, Whole Blood: 5.4 % (11-03 @ 09:31)    TSH: Thyroid Stimulating Hormone, Serum: 6.49 uU/mL (11-03 @ 06:39)    INR: 3.72: Please note: New Critical Value: 5.0 ratio as of 1/2/14. ratio (11.07.17 @ 07:48)

## 2017-11-07 NOTE — PROGRESS NOTE ADULT - PROBLEM SELECTOR PLAN 3
-Patient is on Xarelto for AC and on Toprol for rate control.   -Decrease metoprolol to 25 BID, for rate control and allow permissive htn.  -hold AC now

## 2017-11-07 NOTE — PROGRESS NOTE ADULT - ASSESSMENT
85 [facility] PMHx of [facility], A fib (on Xarelto was brought to Ed by family for slurred speech.  1.Neurology f/u  2.MRI-+ CVA, probable xarelto failure, coumadin until INR 2.0-3.0.  3.Afib-lopressor.  4.Statin.  5.PPI.  6.Stress test in 4-6 weeks.

## 2017-11-08 LAB
ANION GAP SERPL CALC-SCNC: 6 MMOL/L — SIGNIFICANT CHANGE UP (ref 5–17)
APTT BLD: 47.4 SEC — HIGH (ref 27.5–37.4)
BUN SERPL-MCNC: 24 MG/DL — HIGH (ref 7–18)
CALCIUM SERPL-MCNC: 8.2 MG/DL — LOW (ref 8.4–10.5)
CHLORIDE SERPL-SCNC: 110 MMOL/L — HIGH (ref 96–108)
CO2 SERPL-SCNC: 25 MMOL/L — SIGNIFICANT CHANGE UP (ref 22–31)
CREAT SERPL-MCNC: 0.84 MG/DL — SIGNIFICANT CHANGE UP (ref 0.5–1.3)
GLUCOSE SERPL-MCNC: 85 MG/DL — SIGNIFICANT CHANGE UP (ref 70–99)
HCT VFR BLD CALC: 35.2 % — SIGNIFICANT CHANGE UP (ref 34.5–45)
HGB BLD-MCNC: 11.6 G/DL — SIGNIFICANT CHANGE UP (ref 11.5–15.5)
INR BLD: 5.6 RATIO — CRITICAL HIGH (ref 0.88–1.16)
MCHC RBC-ENTMCNC: 31.7 PG — SIGNIFICANT CHANGE UP (ref 27–34)
MCHC RBC-ENTMCNC: 32.9 GM/DL — SIGNIFICANT CHANGE UP (ref 32–36)
MCV RBC AUTO: 96.3 FL — SIGNIFICANT CHANGE UP (ref 80–100)
PLATELET # BLD AUTO: 178 K/UL — SIGNIFICANT CHANGE UP (ref 150–400)
POTASSIUM SERPL-MCNC: 3.9 MMOL/L — SIGNIFICANT CHANGE UP (ref 3.5–5.3)
POTASSIUM SERPL-SCNC: 3.9 MMOL/L — SIGNIFICANT CHANGE UP (ref 3.5–5.3)
PROTHROM AB SERPL-ACNC: 63.2 SEC — HIGH (ref 9.8–12.7)
RBC # BLD: 3.65 M/UL — LOW (ref 3.8–5.2)
RBC # FLD: 13.3 % — SIGNIFICANT CHANGE UP (ref 10.3–14.5)
SODIUM SERPL-SCNC: 141 MMOL/L — SIGNIFICANT CHANGE UP (ref 135–145)
WBC # BLD: 4.4 K/UL — SIGNIFICANT CHANGE UP (ref 3.8–10.5)
WBC # FLD AUTO: 4.4 K/UL — SIGNIFICANT CHANGE UP (ref 3.8–10.5)

## 2017-11-08 RX ORDER — PHYTONADIONE (VIT K1) 5 MG
2.5 TABLET ORAL ONCE
Qty: 0 | Refills: 0 | Status: COMPLETED | OUTPATIENT
Start: 2017-11-08 | End: 2017-11-08

## 2017-11-08 RX ADMIN — ATORVASTATIN CALCIUM 40 MILLIGRAM(S): 80 TABLET, FILM COATED ORAL at 22:03

## 2017-11-08 RX ADMIN — Medication 25 MILLIGRAM(S): at 18:17

## 2017-11-08 RX ADMIN — Medication 25 MILLIGRAM(S): at 05:42

## 2017-11-08 RX ADMIN — PANTOPRAZOLE SODIUM 40 MILLIGRAM(S): 20 TABLET, DELAYED RELEASE ORAL at 05:42

## 2017-11-08 RX ADMIN — Medication 2.5 MILLIGRAM(S): at 11:22

## 2017-11-08 RX ADMIN — Medication 145 MILLIGRAM(S): at 11:22

## 2017-11-08 NOTE — DIETITIAN INITIAL EVALUATION ADULT. - PROBLEM SELECTOR PLAN 1
-Patient presented with slurring of speech started yesterday concern for stroke  -Admit to telemetry to r/o cardiac arrhythmias.  -T1 -ve, f/u T2 and t3  -EKG: A fib at 82 bpm  -CT head No CT evidence for an acute territorial infarct. 2 small age indeterminate lacunar infarcts.  -Concerns for stroke as patient has h/o A Fib and but already on Xarelto 15mg daily. Reports compliance.   -Started aspirin, statin. Hold PO medication to allow permissive hypertension. Restart after 24 hours.   -Follow MRI head. MRA head and neck  -Follow Echo.  -Cardio Dr Knight. Neurology: Dr Damon consulted (confirmed with Dr Dyer, outpatient neurology)

## 2017-11-08 NOTE — DIETITIAN INITIAL EVALUATION ADULT. - SOURCE
patient/other (specify)/son at bedside; chart review. Pt speaks Tagalog and some English, prefers family to contact RD/ for her/family/significant other

## 2017-11-08 NOTE — PROGRESS NOTE ADULT - SUBJECTIVE AND OBJECTIVE BOX
Patient is a 85y old  Female who presents with a chief complaint of Slurring of speech (06 Nov 2017 10:42)    pt seen in tele [x  ], reg med floor [   ], sitting on bed [x  ], chair at bedside [   ], a+o x3 [ x ], lethargic [  ],    nad [x  ],      Allergies    No Known Allergies        Vitals    T(F): 97.6 (11-08-17 @ 05:34), Max: 98.2 (11-08-17 @ 02:01)  HR: 95 (11-08-17 @ 05:34) (79 - 95)  BP: 133/66 (11-08-17 @ 05:34) (129/77 - 147/83)  RR: 18 (11-08-17 @ 05:34) (16 - 18)  SpO2: 100% (11-08-17 @ 05:34) (100% - 100%)  Wt(kg): --  CAPILLARY BLOOD GLUCOSE      POCT Blood Glucose.: 131 mg/dL (07 Nov 2017 16:47)      Labs                          11.6   4.4   )-----------( 178      ( 08 Nov 2017 07:32 )             35.2       11-08    141  |  110<H>  |  24<H>  ----------------------------<  85  3.9   |  25  |  0.84    Ca    8.2<L>      08 Nov 2017 07:32  Phos  3.4     11-07  Mg     2.1     11-07                  Radiology Results      Meds    MEDICATIONS  (STANDING):  atorvastatin 40 milliGRAM(s) Oral at bedtime  fenofibrate Tablet 145 milliGRAM(s) Oral daily  influenza   Vaccine 0.5 milliLiter(s) IntraMuscular once  metoprolol     tartrate 25 milliGRAM(s) Oral two times a day  pantoprazole    Tablet 40 milliGRAM(s) Oral before breakfast  phytonadione   Solution 2.5 milliGRAM(s) Oral once      MEDICATIONS  (PRN):      Physical Exam    Neuro :  no focal deficits  Respiratory: CTA B/L  CV: RRR, S1S2, no murmurs,   Abdominal: Soft, NT, ND +BS,  Extremities: No edema, + peripheral pulses    ASSESSMENT    s/p Slurred speech 2nd to acute/ subacute lacunar infarct in the right corona radiata/centrum semiovale likely 2nd to xarelto failure  h/o HTN (hypertension)  A fib   No significant past surgical history      PLAN    mri/ mra result with acute/ subacute cva noted  neuro f/u   hold coumadin 2nd to supratherapeutic inr  vit k 2.5 mg x 1dose given  f/u inr in am and dose coumadin accordingly   cardio f/u noted  stress test in 4-6 wks  cont current meds  d/c plan when inr 2-3

## 2017-11-08 NOTE — DIETITIAN INITIAL EVALUATION ADULT. - MD RECOMMEND
Ensure Enlive 1can daily as needed as medically feasible ( 350 kcal, 15 g protein) Ensure Enlive 1can daily as needed as medically feasible ( 350 kcal, 20 g protein)

## 2017-11-08 NOTE — PROGRESS NOTE ADULT - ASSESSMENT
85 [facility] with significant PMHx of [facility], A fib (on Xarelto was brought to Ed by family for slurred speech.  1.Vitamin k 2.5mgx1.  2.MRI-+ CVA, probable xarelto failure, coumadin until INR 2.0-3.0.  3.Afib-lopressor.  4.Statin.  5.PPI.  6.Stress test in 4-6 weeks.

## 2017-11-08 NOTE — PROGRESS NOTE ADULT - PROBLEM SELECTOR PLAN 1
-Patient admitted to tele with concern for stroke  -MRI pos for acute lacunar stroke to the rt corona radiata  -EKG: A fib at 82 bpm  -c/w  statin  -c/w BP meds as symptoms started over 48hrs prior  -Echo: EF 50-55%, GIIDD  -passed speech and swallow eval   -PT eval to home  -hold all AC today  -monitor PT/INR , 5.7 this am  -Cardio Dr Knight.   -Neurology: Dr Damon

## 2017-11-08 NOTE — PROGRESS NOTE ADULT - ASSESSMENT
86 y/o [facility] female ( ID [id number]) with significant PMHx of HTN, A fib (on Xarelto) was brought to Ed by family for slurred speech x yesterday morning. Pt was admitted for Stroke.

## 2017-11-08 NOTE — DIETITIAN INITIAL EVALUATION ADULT. - OTHER INFO
nutrition assessment for length of stay; lives home with family; skin intact; denied GI distress, chewing or swallowing problem at present, no specific food choices reported, tolerating 50 to 60% meals per flow sheets; s/p swallow evaluation with regular diet, thin liquid recommended 11/6/17

## 2017-11-08 NOTE — PROGRESS NOTE ADULT - SUBJECTIVE AND OBJECTIVE BOX
CHIEF COMPLAINT:Patient is a 85y old  Female who presents with a chief complaint of Slurring of speech. Pt appears comfortable.    	  REVIEW OF SYSTEMS:  CONSTITUTIONAL: No fever, weight loss, or fatigue  EYES: No eye pain, visual disturbances, or discharge  ENT:  No difficulty hearing, tinnitus, vertigo; No sinus or throat pain  NECK: No pain or stiffness  RESPIRATORY: No cough, wheezing, chills or hemoptysis; No Shortness of Breath  CARDIOVASCULAR: No chest pain, palpitations, passing out, dizziness, or leg swelling  GASTROINTESTINAL: No abdominal or epigastric pain. No nausea, vomiting, or hematemesis; No diarrhea or constipation. No melena or hematochezia.  GENITOURINARY: No dysuria, frequency, hematuria, or incontinence  NEUROLOGICAL: No headaches, memory loss, loss of strength, numbness, or tremors  SKIN: No itching, burning, rashes, or lesions   LYMPH Nodes: No enlarged glands  ENDOCRINE: No heat or cold intolerance; No hair loss  MUSCULOSKELETAL: No joint pain or swelling; No muscle, back, or extremity pain  PSYCHIATRIC: No depression, anxiety, mood swings, or difficulty sleeping  HEME/LYMPH: No easy bruising, or bleeding gums  ALLERGY AND IMMUNOLOGIC: No hives or eczema	      PHYSICAL EXAM:  T(C): 36.4 (11-08-17 @ 05:34), Max: 36.8 (11-08-17 @ 02:01)  HR: 95 (11-08-17 @ 05:34) (79 - 95)  BP: 133/66 (11-08-17 @ 05:34) (129/77 - 147/83)  RR: 18 (11-08-17 @ 05:34) (16 - 18)  SpO2: 100% (11-08-17 @ 05:34) (100% - 100%)      Appearance: Normal	  HEENT:   Normal oral mucosa, PERRL, EOMI	  Lymphatic: No lymphadenopathy  Cardiovascular: Normal S1 S2, No JVD, No murmurs, No edema  Respiratory: Lungs clear to auscultation	  Psychiatry: A & O x 3, Mood & affect appropriate  Gastrointestinal:  Soft, Non-tender, + BS	  Skin: No rashes, No ecchymoses, No cyanosis	  Neurologic: Non-focal  Extremities: Normal range of motion, No clubbing, cyanosis or edema  Vascular: Peripheral pulses palpable 2+ bilaterally    MEDICATIONS  (STANDING):  atorvastatin 40 milliGRAM(s) Oral at bedtime  fenofibrate Tablet 145 milliGRAM(s) Oral daily  influenza   Vaccine 0.5 milliLiter(s) IntraMuscular once  metoprolol     tartrate 25 milliGRAM(s) Oral two times a day  pantoprazole    Tablet 40 milliGRAM(s) Oral before breakfast      	  LABS:	 	                         11.6   4.4   )-----------( 178      ( 08 Nov 2017 07:32 )             35.2     11-08    141  |  110<H>  |  24<H>  ----------------------------<  85  3.9   |  25  |  0.84    Ca    8.2<L>      08 Nov 2017 07:32  Phos  3.4     11-07  Mg     2.1     11-07        Lipid Profile: Cholesterol 105  LDL 41  HDL 51  TG 63    HgA1c: Hemoglobin A1C, Whole Blood: 5.4 % (11-03 @ 09:31)    TSH: Thyroid Stimulating Hormone, Serum: 6.49 uU/mL (11-03 @ 06:39)      INR: 5.60: TYPE:(C=Critical, N=Notification, A=Abnormal) C

## 2017-11-08 NOTE — PROGRESS NOTE ADULT - SUBJECTIVE AND OBJECTIVE BOX
PGY1 Note discussed with supervising resident and primary attending.    Patient is a 85y old  Female who presents with a chief complaint of Slurring of speech (06 Nov 2017 10:42)      INTERVAL HPI/OVERNIGHT EVENTS: No overnight events.    MEDICATIONS  (STANDING):  atorvastatin 40 milliGRAM(s) Oral at bedtime  fenofibrate Tablet 145 milliGRAM(s) Oral daily  influenza   Vaccine 0.5 milliLiter(s) IntraMuscular once  metoprolol     tartrate 25 milliGRAM(s) Oral two times a day  pantoprazole    Tablet 40 milliGRAM(s) Oral before breakfast    MEDICATIONS  (PRN):      Allergies    No Known Allergies    Intolerances        REVIEW OF SYSTEMS:  CONSTITUTIONAL: No fever, weight loss, or fatigue  RESPIRATORY: No cough, wheezing, chills or hemoptysis; No shortness of breath  CARDIOVASCULAR: No chest pain, palpitations, dizziness, or leg swelling  GASTROINTESTINAL: No abdominal or epigastric pain. No nausea, vomiting, or hematemesis; No diarrhea or constipation. No melena or hematochezia.  NEUROLOGICAL: No headaches, memory loss, loss of strength, numbness, or tremors  SKIN: No itching, burning, rashes, or lesions     Vital Signs Last 24 Hrs  T(C): 36.4 (08 Nov 2017 05:34), Max: 36.8 (08 Nov 2017 02:01)  T(F): 97.6 (08 Nov 2017 05:34), Max: 98.2 (08 Nov 2017 02:01)  HR: 95 (08 Nov 2017 05:34) (79 - 95)  BP: 133/66 (08 Nov 2017 05:34) (129/77 - 147/83)  BP(mean): --  RR: 18 (08 Nov 2017 05:34) (16 - 18)  SpO2: 100% (08 Nov 2017 05:34) (100% - 100%)    PHYSICAL EXAM:  GENERAL: NAD, well-groomed, well-developed  HEAD:  Atraumatic, Normocephalic  EYES: EOMI, PERRLA, conjunctiva and sclera clear  NECK: Supple, No JVD, Normal thyroid  CHEST/LUNG: Clear to percussion bilaterally; No rales, rhonchi, wheezing, or rubs  HEART: Regular rate and rhythm; No murmurs, rubs, or gallops  ABDOMEN: Soft, Nontender, Nondistended; Bowel sounds present  NERVOUS SYSTEM:  Alert & Oriented X3, Good concentration; Motor Strength 5/5 B/L   EXTREMITIES:  2+ Peripheral Pulses, No clubbing, cyanosis, or edema  SKIN;    LABS:                        11.6   4.4   )-----------( 178      ( 08 Nov 2017 07:32 )             35.2     11-08    141  |  110<H>  |  24<H>  ----------------------------<  85  3.9   |  25  |  0.84    Ca    8.2<L>      08 Nov 2017 07:32  Phos  3.4     11-07  Mg     2.1     11-07      PT/INR - ( 08 Nov 2017 07:32 )   PT: 63.2 sec;   INR: 5.60 ratio         PTT - ( 08 Nov 2017 07:32 )  PTT:47.4 sec    CAPILLARY BLOOD GLUCOSE      POCT Blood Glucose.: 131 mg/dL (07 Nov 2017 16:47)      RADIOLOGY & ADDITIONAL TESTS:    Imaging Personally Reviewed:  [x ] YES  [ ] NO    Consultant(s) Notes Reviewed:  [ x] YES  [ ] NO

## 2017-11-09 LAB
ANION GAP SERPL CALC-SCNC: 8 MMOL/L — SIGNIFICANT CHANGE UP (ref 5–17)
APTT BLD: 44.7 SEC — HIGH (ref 27.5–37.4)
BUN SERPL-MCNC: 28 MG/DL — HIGH (ref 7–18)
CALCIUM SERPL-MCNC: 8.5 MG/DL — SIGNIFICANT CHANGE UP (ref 8.4–10.5)
CHLORIDE SERPL-SCNC: 106 MMOL/L — SIGNIFICANT CHANGE UP (ref 96–108)
CO2 SERPL-SCNC: 27 MMOL/L — SIGNIFICANT CHANGE UP (ref 22–31)
CREAT SERPL-MCNC: 1.08 MG/DL — SIGNIFICANT CHANGE UP (ref 0.5–1.3)
GLUCOSE SERPL-MCNC: 91 MG/DL — SIGNIFICANT CHANGE UP (ref 70–99)
HCT VFR BLD CALC: 40.8 % — SIGNIFICANT CHANGE UP (ref 34.5–45)
HGB BLD-MCNC: 13 G/DL — SIGNIFICANT CHANGE UP (ref 11.5–15.5)
INR BLD: 1.42 RATIO — HIGH (ref 0.88–1.16)
MCHC RBC-ENTMCNC: 31.8 GM/DL — LOW (ref 32–36)
MCHC RBC-ENTMCNC: 31.8 PG — SIGNIFICANT CHANGE UP (ref 27–34)
MCV RBC AUTO: 100.1 FL — HIGH (ref 80–100)
PLATELET # BLD AUTO: 196 K/UL — SIGNIFICANT CHANGE UP (ref 150–400)
POTASSIUM SERPL-MCNC: 3.9 MMOL/L — SIGNIFICANT CHANGE UP (ref 3.5–5.3)
POTASSIUM SERPL-SCNC: 3.9 MMOL/L — SIGNIFICANT CHANGE UP (ref 3.5–5.3)
PROTHROM AB SERPL-ACNC: 15.6 SEC — HIGH (ref 9.8–12.7)
RBC # BLD: 4.07 M/UL — SIGNIFICANT CHANGE UP (ref 3.8–5.2)
RBC # FLD: 14 % — SIGNIFICANT CHANGE UP (ref 10.3–14.5)
SODIUM SERPL-SCNC: 141 MMOL/L — SIGNIFICANT CHANGE UP (ref 135–145)
WBC # BLD: 5.4 K/UL — SIGNIFICANT CHANGE UP (ref 3.8–10.5)
WBC # FLD AUTO: 5.4 K/UL — SIGNIFICANT CHANGE UP (ref 3.8–10.5)

## 2017-11-09 RX ORDER — WARFARIN SODIUM 2.5 MG/1
3 TABLET ORAL ONCE
Qty: 0 | Refills: 0 | Status: COMPLETED | OUTPATIENT
Start: 2017-11-09 | End: 2017-11-09

## 2017-11-09 RX ADMIN — Medication 145 MILLIGRAM(S): at 13:31

## 2017-11-09 RX ADMIN — Medication 25 MILLIGRAM(S): at 05:50

## 2017-11-09 RX ADMIN — PANTOPRAZOLE SODIUM 40 MILLIGRAM(S): 20 TABLET, DELAYED RELEASE ORAL at 05:50

## 2017-11-09 RX ADMIN — ATORVASTATIN CALCIUM 40 MILLIGRAM(S): 80 TABLET, FILM COATED ORAL at 22:21

## 2017-11-09 RX ADMIN — WARFARIN SODIUM 3 MILLIGRAM(S): 2.5 TABLET ORAL at 22:21

## 2017-11-09 RX ADMIN — Medication 25 MILLIGRAM(S): at 17:29

## 2017-11-09 NOTE — PROGRESS NOTE ADULT - ASSESSMENT
85 [facility]x of [facility], A fib (on Xarelto was brought to Ed by family for slurred speech.  1.MRI-+ CVA, probable xarelto failure, coumadin until INR 2.0-3.0.  2.Afib-lopressor.  3.Statin.  4..PPI.  5.Stress test in 4-6 weeks.

## 2017-11-09 NOTE — PROGRESS NOTE ADULT - SUBJECTIVE AND OBJECTIVE BOX
PGY1 Note discussed with supervising resident and primary attending.    Patient is a 85y old  Female who presents with a chief complaint of Slurring of speech (06 Nov 2017 10:42)      INTERVAL HPI/OVERNIGHT EVENTS: No overnight events.    MEDICATIONS  (STANDING):  atorvastatin 40 milliGRAM(s) Oral at bedtime  fenofibrate Tablet 145 milliGRAM(s) Oral daily  influenza   Vaccine 0.5 milliLiter(s) IntraMuscular once  metoprolol     tartrate 25 milliGRAM(s) Oral two times a day  pantoprazole    Tablet 40 milliGRAM(s) Oral before breakfast  warfarin 3 milliGRAM(s) Oral once    MEDICATIONS  (PRN):      Allergies    No Known Allergies    Intolerances        REVIEW OF SYSTEMS:  CONSTITUTIONAL: No fever, weight loss, or fatigue  RESPIRATORY: No cough, wheezing, chills or hemoptysis; No shortness of breath  CARDIOVASCULAR: No chest pain, palpitations, dizziness, or leg swelling  GASTROINTESTINAL: No abdominal or epigastric pain. No nausea, vomiting, or hematemesis; No diarrhea or constipation. No melena or hematochezia.  NEUROLOGICAL: No headaches, memory loss, loss of strength, numbness, or tremors  SKIN: No itching, burning, rashes, or lesions     Vital Signs Last 24 Hrs  T(C): 36.4 (09 Nov 2017 11:25), Max: 36.9 (08 Nov 2017 17:34)  T(F): 97.6 (09 Nov 2017 11:25), Max: 98.4 (08 Nov 2017 17:34)  HR: 74 (09 Nov 2017 11:25) (69 - 118)  BP: 134/73 (09 Nov 2017 11:25) (118/80 - 147/77)  BP(mean): --  RR: 16 (09 Nov 2017 11:25) (16 - 18)  SpO2: 100% (09 Nov 2017 11:25) (97% - 100%)    PHYSICAL EXAM:  GENERAL: NAD, well-groomed, well-developed  HEAD:  Atraumatic, Normocephalic  EYES: EOMI, PERRLA, conjunctiva and sclera clear  NECK: Supple, No JVD, Normal thyroid  CHEST/LUNG: Clear to percussion bilaterally; No rales, rhonchi, wheezing, or rubs  HEART: Regular rate and rhythm; No murmurs, rubs, or gallops  ABDOMEN: Soft, Nontender, Nondistended; Bowel sounds present  NERVOUS SYSTEM:  Alert & Oriented X3, Good concentration; Motor Strength 5/5 B/L   EXTREMITIES:  2+ Peripheral Pulses, No clubbing, cyanosis, or edema  SKIN;    LABS:                        13.0   5.4   )-----------( 196      ( 09 Nov 2017 07:21 )             40.8     11-09    141  |  106  |  28<H>  ----------------------------<  91  3.9   |  27  |  1.08    Ca    8.5      09 Nov 2017 07:21      PT/INR - ( 09 Nov 2017 07:21 )   PT: 15.6 sec;   INR: 1.42 ratio         PTT - ( 09 Nov 2017 07:21 )  PTT:44.7 sec    CAPILLARY BLOOD GLUCOSE          RADIOLOGY & ADDITIONAL TESTS:    Imaging Personally Reviewed:  [ x] YES  [ ] NO    Consultant(s) Notes Reviewed:  [ x] YES  [ ] NO

## 2017-11-09 NOTE — PROGRESS NOTE ADULT - PROBLEM SELECTOR PLAN 1
-Patient admitted to tele with concern for stroke  -MRI pos for acute lacunar stroke to the rt corona radiata  -EKG: A fib at 82 bpm  -c/w  statin  -c/w BP meds as symptoms started over 48hrs prior  -Echo: EF 50-55%, GIIDD  -passed speech and swallow eval   -PT eval to home  -hold all AC today  -monitor PT/INR , 1.4 this am  -warfarin 3mg today  -Cardio Dr Knight.   -Neurology: Dr Damon

## 2017-11-09 NOTE — PROGRESS NOTE ADULT - SUBJECTIVE AND OBJECTIVE BOX
Patient is a 85y old  Female who presents with a chief complaint of Slurring of speech (06 Nov 2017 10:42)    pt seen in tele [x  ], reg med floor [   ], sitting on bed [x  ], chair at bedside [   ], a+o x3 [ x ], lethargic [  ],    nad [x  ],      Allergies    No Known Allergies        Vitals    T(F): 98.1 (11-09-17 @ 04:59), Max: 98.4 (11-08-17 @ 17:34)  HR: 72 (11-09-17 @ 04:59) (69 - 118)  BP: 147/77 (11-09-17 @ 04:59) (118/80 - 147/77)  RR: 16 (11-09-17 @ 04:59) (16 - 18)  SpO2: 100% (11-09-17 @ 04:59) (97% - 100%)  Wt(kg): --  CAPILLARY BLOOD GLUCOSE      POCT Blood Glucose.: 131 mg/dL (07 Nov 2017 16:47)      Labs                          13.0   5.4   )-----------( 196      ( 09 Nov 2017 07:21 )             40.8       11-09    141  |  106  |  28<H>  ----------------------------<  91  3.9   |  27  |  1.08    Ca    8.5      09 Nov 2017 07:21        Radiology Results      Meds    MEDICATIONS  (STANDING):  atorvastatin 40 milliGRAM(s) Oral at bedtime  fenofibrate Tablet 145 milliGRAM(s) Oral daily  influenza   Vaccine 0.5 milliLiter(s) IntraMuscular once  metoprolol     tartrate 25 milliGRAM(s) Oral two times a day  pantoprazole    Tablet 40 milliGRAM(s) Oral before breakfast      MEDICATIONS  (PRN):      Physical Exam    Neuro :  no focal deficits  Respiratory: CTA B/L  CV: RRR, S1S2, no murmurs,   Abdominal: Soft, NT, ND +BS,  Extremities: No edema, + peripheral pulses    ASSESSMENT    s/p Slurred speech 2nd to acute/ subacute lacunar infarct in the right corona radiata/centrum semiovale likely 2nd to xarelto failure  h/o HTN (hypertension)  A fib   No significant past surgical history      PLAN    mri/ mra result with acute/ subacute cva noted  neuro f/u   restart coumadin 3mg   f/u inr in am and dose coumadin accordingly   cardio f/u noted  stress test in 4-6 wks  cont current meds  d/c plan when inr 2-3

## 2017-11-09 NOTE — PROGRESS NOTE ADULT - ASSESSMENT
86 y/o [facility] female ( ID [id number]) with significant PMHx of HTN, A fib (on Xarelto) was brought to Ed by family for slurred speech x yesterday morning. Pt was admitted for Stroke.

## 2017-11-09 NOTE — PROGRESS NOTE ADULT - SUBJECTIVE AND OBJECTIVE BOX
CHIEF COMPLAINT:Patient is a 85y old  Female who presents with a chief complaint of Slurring of speech.Pt appears comfortable.    	  REVIEW OF SYSTEMS:  CONSTITUTIONAL: No fever, weight loss, or fatigue  EYES: No eye pain, visual disturbances, or discharge  ENT:  No difficulty hearing, tinnitus, vertigo; No sinus or throat pain  NECK: No pain or stiffness  RESPIRATORY: No cough, wheezing, chills or hemoptysis; No Shortness of Breath  CARDIOVASCULAR: No chest pain, palpitations, passing out, dizziness, or leg swelling  GASTROINTESTINAL: No abdominal or epigastric pain. No nausea, vomiting, or hematemesis; No diarrhea or constipation. No melena or hematochezia.  GENITOURINARY: No dysuria, frequency, hematuria, or incontinence  NEUROLOGICAL: No headaches, memory loss, loss of strength, numbness, or tremors  SKIN: No itching, burning, rashes, or lesions   LYMPH Nodes: No enlarged glands  ENDOCRINE: No heat or cold intolerance; No hair loss  MUSCULOSKELETAL: No joint pain or swelling; No muscle, back, or extremity pain  PSYCHIATRIC: No depression, anxiety, mood swings, or difficulty sleeping  HEME/LYMPH: No easy bruising, or bleeding gums  ALLERGY AND IMMUNOLOGIC: No hives or eczema	        PHYSICAL EXAM:  T(C): 36.7 (11-09-17 @ 04:59), Max: 36.9 (11-08-17 @ 17:34)  HR: 72 (11-09-17 @ 04:59) (69 - 118)  BP: 147/77 (11-09-17 @ 04:59) (118/80 - 147/77)  RR: 16 (11-09-17 @ 04:59) (16 - 18)  SpO2: 100% (11-09-17 @ 04:59) (97% - 100%)      Appearance: Normal	  HEENT:   Normal oral mucosa, PERRL, EOMI	  Lymphatic: No lymphadenopathy  Cardiovascular: Normal S1 S2, No JVD, No murmurs, No edema  Respiratory: Lungs clear to auscultation	  Psychiatry: A & O x 3, Mood & affect appropriate  Gastrointestinal:  Soft, Non-tender, + BS	  Skin: No rashes, No ecchymoses, No cyanosis	  Neurologic: Non-focal  Extremities: Normal range of motion, No clubbing, cyanosis or edema  Vascular: Peripheral pulses palpable 2+ bilaterally    MEDICATIONS  (STANDING):  atorvastatin 40 milliGRAM(s) Oral at bedtime  fenofibrate Tablet 145 milliGRAM(s) Oral daily  influenza   Vaccine 0.5 milliLiter(s) IntraMuscular once  metoprolol     tartrate 25 milliGRAM(s) Oral two times a day  pantoprazole    Tablet 40 milliGRAM(s) Oral before breakfast        	  LABS:	 	                         13.0   5.4   )-----------( 196      ( 09 Nov 2017 07:21 )             40.8     11-09    141  |  106  |  28<H>  ----------------------------<  91  3.9   |  27  |  1.08    Ca    8.5      09 Nov 2017 07:21        Lipid Profile: Cholesterol 105  LDL 41  HDL 51  TG 63    HgA1c: Hemoglobin A1C, Whole Blood: 5.4 % (11-03 @ 09:31)    TSH: Thyroid Stimulating Hormone, Serum: 6.49 uU/mL (11-03 @ 06:39)      Prothrombin Time and INR, Plasma in AM (11.09.17 @ 07:21)    Prothrombin Time, Plasma: 15.6: Effective March 21st, the reference range for PT has changed. sec    INR: 1.42: Please note: New Critical Value: 5.0 ratio as of 1/2/14. ratio

## 2017-11-10 LAB
ANION GAP SERPL CALC-SCNC: 7 MMOL/L — SIGNIFICANT CHANGE UP (ref 5–17)
APTT BLD: 45.7 SEC — HIGH (ref 27.5–37.4)
BUN SERPL-MCNC: 31 MG/DL — HIGH (ref 7–18)
CALCIUM SERPL-MCNC: 8.3 MG/DL — LOW (ref 8.4–10.5)
CHLORIDE SERPL-SCNC: 109 MMOL/L — HIGH (ref 96–108)
CO2 SERPL-SCNC: 26 MMOL/L — SIGNIFICANT CHANGE UP (ref 22–31)
CREAT SERPL-MCNC: 0.96 MG/DL — SIGNIFICANT CHANGE UP (ref 0.5–1.3)
GLUCOSE SERPL-MCNC: 86 MG/DL — SIGNIFICANT CHANGE UP (ref 70–99)
HCT VFR BLD CALC: 37.8 % — SIGNIFICANT CHANGE UP (ref 34.5–45)
HGB BLD-MCNC: 11.9 G/DL — SIGNIFICANT CHANGE UP (ref 11.5–15.5)
INR BLD: 1.63 RATIO — HIGH (ref 0.88–1.16)
MCHC RBC-ENTMCNC: 30.6 PG — SIGNIFICANT CHANGE UP (ref 27–34)
MCHC RBC-ENTMCNC: 31.4 GM/DL — LOW (ref 32–36)
MCV RBC AUTO: 97.2 FL — SIGNIFICANT CHANGE UP (ref 80–100)
PLATELET # BLD AUTO: 183 K/UL — SIGNIFICANT CHANGE UP (ref 150–400)
POTASSIUM SERPL-MCNC: 3.9 MMOL/L — SIGNIFICANT CHANGE UP (ref 3.5–5.3)
POTASSIUM SERPL-SCNC: 3.9 MMOL/L — SIGNIFICANT CHANGE UP (ref 3.5–5.3)
PROTHROM AB SERPL-ACNC: 18 SEC — HIGH (ref 9.8–12.7)
RBC # BLD: 3.89 M/UL — SIGNIFICANT CHANGE UP (ref 3.8–5.2)
RBC # FLD: 13.4 % — SIGNIFICANT CHANGE UP (ref 10.3–14.5)
SODIUM SERPL-SCNC: 142 MMOL/L — SIGNIFICANT CHANGE UP (ref 135–145)
WBC # BLD: 4.4 K/UL — SIGNIFICANT CHANGE UP (ref 3.8–10.5)
WBC # FLD AUTO: 4.4 K/UL — SIGNIFICANT CHANGE UP (ref 3.8–10.5)

## 2017-11-10 RX ORDER — WARFARIN SODIUM 2.5 MG/1
3 TABLET ORAL ONCE
Qty: 0 | Refills: 0 | Status: COMPLETED | OUTPATIENT
Start: 2017-11-10 | End: 2017-11-10

## 2017-11-10 RX ADMIN — Medication 145 MILLIGRAM(S): at 11:11

## 2017-11-10 RX ADMIN — WARFARIN SODIUM 3 MILLIGRAM(S): 2.5 TABLET ORAL at 22:38

## 2017-11-10 RX ADMIN — Medication 25 MILLIGRAM(S): at 06:32

## 2017-11-10 RX ADMIN — PANTOPRAZOLE SODIUM 40 MILLIGRAM(S): 20 TABLET, DELAYED RELEASE ORAL at 06:32

## 2017-11-10 RX ADMIN — Medication 25 MILLIGRAM(S): at 18:23

## 2017-11-10 RX ADMIN — ATORVASTATIN CALCIUM 40 MILLIGRAM(S): 80 TABLET, FILM COATED ORAL at 22:38

## 2017-11-10 NOTE — PROGRESS NOTE ADULT - PROBLEM SELECTOR PLAN 1
-Patient admitted to tele with concern for stroke  -MRI pos for acute lacunar stroke to the rt corona radiata  -EKG: A fib at 82 bpm  -c/w  statin  -c/w BP meds as symptoms started over 48hrs prior  -Echo: EF 50-55%, GIIDD  -passed speech and swallow eval   -PT eval to home  -hold all AC today  -monitor PT/INR , 1.62this am  -warfarin 3mg today  -Cardio Dr Knight.   -Neurology: Dr Damon  -d/c plan if IRN 2-3

## 2017-11-10 NOTE — PROGRESS NOTE ADULT - SUBJECTIVE AND OBJECTIVE BOX
Patient is a 85y old  Female who presents with a chief complaint of Slurring of speech (06 Nov 2017 10:42)    pt seen in tele [x  ], reg med floor [   ], sitting on bed [x  ], chair at bedside [   ], a+o x3 [ x ],   lethargic [  ],    nad [x  ]        Allergies    No Known Allergies        Vitals    T(F): 98.1 (11-10-17 @ 05:30), Max: 98.5 (11-09-17 @ 21:45)  HR: 74 (11-10-17 @ 05:30) (68 - 95)  BP: 151/82 (11-10-17 @ 05:30) (125/78 - 151/82)  RR: 17 (11-10-17 @ 05:30) (16 - 18)  SpO2: 100% (11-10-17 @ 05:30) (99% - 100%)  Wt(kg): --  CAPILLARY BLOOD GLUCOSE          Labs                          11.9   4.4   )-----------( 183      ( 10 Nov 2017 07:17 )             37.8       11-10    142  |  109<H>  |  31<H>  ----------------------------<  86  3.9   |  26  |  0.96    Ca    8.3<L>      10 Nov 2017 07:17                  Radiology Results      Meds    MEDICATIONS  (STANDING):  atorvastatin 40 milliGRAM(s) Oral at bedtime  fenofibrate Tablet 145 milliGRAM(s) Oral daily  influenza   Vaccine 0.5 milliLiter(s) IntraMuscular once  metoprolol     tartrate 25 milliGRAM(s) Oral two times a day  pantoprazole    Tablet 40 milliGRAM(s) Oral before breakfast      MEDICATIONS  (PRN):      Physical Exam    Neuro :  no focal deficits  Respiratory: CTA B/L  CV: RRR, S1S2, no murmurs,   Abdominal: Soft, NT, ND +BS,  Extremities: No edema, + peripheral pulses    ASSESSMENT    s/p Slurred speech 2nd to acute/ subacute lacunar infarct in the right corona radiata/centrum semiovale likely 2nd to xarelto failure  h/o HTN (hypertension)  A fib   No significant past surgical history      PLAN    mri/ mra result with acute/ subacute cva noted  neuro f/u   restart coumadin 3mg   f/u inr in am and dose coumadin accordingly   cardio f/u noted  stress test in 4-6 wks  cont current meds  d/c plan when inr 2-3 Patient is a 85y old  Female who presents with a chief complaint of Slurring of speech (06 Nov 2017 10:42)    pt seen in tele [x  ], reg med floor [   ], sitting on bed [x  ], chair at bedside [   ], a+o x3 [ x ],   lethargic [  ],    nad [x  ]        Allergies    No Known Allergies        Vitals    T(F): 98.1 (11-10-17 @ 05:30), Max: 98.5 (11-09-17 @ 21:45)  HR: 74 (11-10-17 @ 05:30) (68 - 95)  BP: 151/82 (11-10-17 @ 05:30) (125/78 - 151/82)  RR: 17 (11-10-17 @ 05:30) (16 - 18)  SpO2: 100% (11-10-17 @ 05:30) (99% - 100%)  Wt(kg): --  CAPILLARY BLOOD GLUCOSE          Labs                          11.9   4.4   )-----------( 183      ( 10 Nov 2017 07:17 )             37.8       11-10    142  |  109<H>  |  31<H>  ----------------------------<  86  3.9   |  26  |  0.96    Ca    8.3<L>      10 Nov 2017 07:17      INR: 1.63: Please note: New Critical Value: 5.0 ratio as of 1/2/14. ratio (11.10.17 @ 07:17)      Radiology Results      Meds    MEDICATIONS  (STANDING):  atorvastatin 40 milliGRAM(s) Oral at bedtime  fenofibrate Tablet 145 milliGRAM(s) Oral daily  influenza   Vaccine 0.5 milliLiter(s) IntraMuscular once  metoprolol     tartrate 25 milliGRAM(s) Oral two times a day  pantoprazole    Tablet 40 milliGRAM(s) Oral before breakfast      MEDICATIONS  (PRN):      Physical Exam    Neuro :  no focal deficits  Respiratory: CTA B/L  CV: RRR, S1S2, no murmurs,   Abdominal: Soft, NT, ND +BS,  Extremities: No edema, + peripheral pulses    ASSESSMENT    s/p Slurred speech 2nd to acute/ subacute lacunar infarct in the right corona radiata/centrum semiovale likely 2nd to xarelto failure  h/o HTN (hypertension)  A fib   No significant past surgical history      PLAN    mri/ mra result with acute/ subacute cva noted  neuro f/u   cont coumadin 3mg   f/u inr in am and dose coumadin accordingly   cardio f/u noted  stress test in 4-6 wks  cont current meds  d/c plan when inr 2-3

## 2017-11-10 NOTE — PROGRESS NOTE ADULT - SUBJECTIVE AND OBJECTIVE BOX
CHIEF COMPLAINT: Patient is a 85y old  Female who presents with a chief complaint of Slurring of speech. Pt appears comfortable.    	  REVIEW OF SYSTEMS:  CONSTITUTIONAL: No fever, weight loss, or fatigue  EYES: No eye pain, visual disturbances, or discharge  ENT:  No difficulty hearing, tinnitus, vertigo; No sinus or throat pain  NECK: No pain or stiffness  RESPIRATORY: No cough, wheezing, chills or hemoptysis; No Shortness of Breath  CARDIOVASCULAR: No chest pain, palpitations, passing out, dizziness, or leg swelling  GASTROINTESTINAL: No abdominal or epigastric pain. No nausea, vomiting, or hematemesis; No diarrhea or constipation. No melena or hematochezia.  GENITOURINARY: No dysuria, frequency, hematuria, or incontinence  NEUROLOGICAL: No headaches, memory loss, loss of strength, numbness, or tremors  SKIN: No itching, burning, rashes, or lesions   LYMPH Nodes: No enlarged glands  ENDOCRINE: No heat or cold intolerance; No hair loss  MUSCULOSKELETAL: No joint pain or swelling; No muscle, back, or extremity pain  PSYCHIATRIC: No depression, anxiety, mood swings, or difficulty sleeping  HEME/LYMPH: No easy bruising, or bleeding gums  ALLERGY AND IMMUNOLOGIC: No hives or eczema	      PHYSICAL EXAM:  T(C): 36.7 (11-10-17 @ 05:30), Max: 36.9 (11-09-17 @ 21:45)  HR: 74 (11-10-17 @ 05:30) (68 - 95)  BP: 151/82 (11-10-17 @ 05:30) (125/78 - 151/82)  RR: 17 (11-10-17 @ 05:30) (16 - 18)  SpO2: 100% (11-10-17 @ 05:30) (99% - 100%)      Appearance: Normal	  HEENT:   Normal oral mucosa, PERRL, EOMI	  Lymphatic: No lymphadenopathy  Cardiovascular: Normal S1 S2, No JVD, No murmurs, No edema  Respiratory: Lungs clear to auscultation	  Psychiatry: A & O x 3, Mood & affect appropriate  Gastrointestinal:  Soft, Non-tender, + BS	  Skin: No rashes, No ecchymoses, No cyanosis	  Neurologic: Non-focal  Extremities: Normal range of motion, No clubbing, cyanosis or edema  Vascular: Peripheral pulses palpable 2+ bilaterally    MEDICATIONS  (STANDING):  atorvastatin 40 milliGRAM(s) Oral at bedtime  fenofibrate Tablet 145 milliGRAM(s) Oral daily  influenza   Vaccine 0.5 milliLiter(s) IntraMuscular once  metoprolol     tartrate 25 milliGRAM(s) Oral two times a day  pantoprazole    Tablet 40 milliGRAM(s) Oral before breakfast  warfarin 3 milliGRAM(s) Oral once      	  LABS:	 	                        11.9   4.4   )-----------( 183      ( 10 Nov 2017 07:17 )             37.8     11-10    142  |  109<H>  |  31<H>  ----------------------------<  86  3.9   |  26  |  0.96    Ca    8.3<L>      10 Nov 2017 07:17    Lipid Profile: Cholesterol 105  LDL 41  HDL 51  TG 63    HgA1c: Hemoglobin A1C, Whole Blood: 5.4 % (11-03 @ 09:31)    TSH: Thyroid Stimulating Hormone, Serum: 6.49 uU/mL (11-03 @ 06:39)    INR: 1.63: Please note: New Critical Value: 5.0 ratio as of 1/2/14. ratio (11.10.17 @ 07:17)

## 2017-11-10 NOTE — PROGRESS NOTE ADULT - ASSESSMENT
84 y/o [facility] female ( ID [id number]) with significant PMHx of HTN, A fib (on Xarelto) was brought to Ed by family for slurred speech x yesterday morning. Pt was admitted for Stroke.

## 2017-11-10 NOTE — PROGRESS NOTE ADULT - ASSESSMENT
85 [facility] PMHx of [facility], A fib (on Xarelto was brought to Ed by family for slurred speech.  1.MRI-+ CVA, probable xarelto failure, coumadin until INR 2.0-3.0.  2.Afib-lopressor.  3.Statin.  4..PPI.  5.Stress test in 4-6 weeks.

## 2017-11-10 NOTE — PROGRESS NOTE ADULT - SUBJECTIVE AND OBJECTIVE BOX
PGY1 Note discussed with supervising resident and primary attending.    Patient is a 85y old  Female who presents with a chief complaint of Slurring of speech (06 Nov 2017 10:42)      INTERVAL HPI/OVERNIGHT EVENTS: No overnight events.    MEDICATIONS  (STANDING):  atorvastatin 40 milliGRAM(s) Oral at bedtime  fenofibrate Tablet 145 milliGRAM(s) Oral daily  influenza   Vaccine 0.5 milliLiter(s) IntraMuscular once  metoprolol     tartrate 25 milliGRAM(s) Oral two times a day  pantoprazole    Tablet 40 milliGRAM(s) Oral before breakfast    MEDICATIONS  (PRN):      Allergies    No Known Allergies    Intolerances        REVIEW OF SYSTEMS:  CONSTITUTIONAL: No fever, weight loss, or fatigue  RESPIRATORY: No cough, wheezing, chills or hemoptysis; No shortness of breath  CARDIOVASCULAR: No chest pain, palpitations, dizziness, or leg swelling  GASTROINTESTINAL: No abdominal or epigastric pain. No nausea, vomiting, or hematemesis; No diarrhea or constipation. No melena or hematochezia.  NEUROLOGICAL: No headaches, memory loss, loss of strength, numbness, or tremors  SKIN: No itching, burning, rashes, or lesions     Vital Signs Last 24 Hrs  T(C): 36.7 (10 Nov 2017 05:30), Max: 36.9 (09 Nov 2017 21:45)  T(F): 98.1 (10 Nov 2017 05:30), Max: 98.5 (09 Nov 2017 21:45)  HR: 74 (10 Nov 2017 05:30) (68 - 95)  BP: 151/82 (10 Nov 2017 05:30) (125/78 - 151/82)  BP(mean): --  RR: 17 (10 Nov 2017 05:30) (16 - 18)  SpO2: 100% (10 Nov 2017 05:30) (99% - 100%)    PHYSICAL EXAM:  GENERAL: NAD, well-groomed, well-developed  HEAD:  Atraumatic, Normocephalic  EYES: EOMI, PERRLA, conjunctiva and sclera clear  NECK: Supple, No JVD, Normal thyroid  CHEST/LUNG: Clear to percussion bilaterally; No rales, rhonchi, wheezing, or rubs  HEART: Regular rate and rhythm; No murmurs, rubs, or gallops  ABDOMEN: Soft, Nontender, Nondistended; Bowel sounds present  NERVOUS SYSTEM:  Alert & Oriented X3, Good concentration; Motor Strength 5/5 B/L   EXTREMITIES:  2+ Peripheral Pulses, No clubbing, cyanosis, or edema  SKIN;    LABS:                        11.9   4.4   )-----------( 183      ( 10 Nov 2017 07:17 )             37.8     11-10    142  |  109<H>  |  31<H>  ----------------------------<  86  3.9   |  26  |  0.96    Ca    8.3<L>      10 Nov 2017 07:17      PT/INR - ( 10 Nov 2017 07:17 )   PT: 18.0 sec;   INR: 1.63 ratio         PTT - ( 10 Nov 2017 07:17 )  PTT:45.7 sec    CAPILLARY BLOOD GLUCOSE          RADIOLOGY & ADDITIONAL TESTS:    Imaging Personally Reviewed:  [x ] YES  [ ] NO    Consultant(s) Notes Reviewed:  [x ] YES  [ ] NO

## 2017-11-11 LAB
INR BLD: 3.29 RATIO — HIGH (ref 0.88–1.16)
PROTHROM AB SERPL-ACNC: 36.7 SEC — HIGH (ref 9.8–12.7)

## 2017-11-11 RX ADMIN — Medication 25 MILLIGRAM(S): at 17:46

## 2017-11-11 RX ADMIN — ATORVASTATIN CALCIUM 40 MILLIGRAM(S): 80 TABLET, FILM COATED ORAL at 22:21

## 2017-11-11 RX ADMIN — Medication 145 MILLIGRAM(S): at 13:30

## 2017-11-11 RX ADMIN — PANTOPRAZOLE SODIUM 40 MILLIGRAM(S): 20 TABLET, DELAYED RELEASE ORAL at 05:54

## 2017-11-11 RX ADMIN — Medication 25 MILLIGRAM(S): at 05:55

## 2017-11-11 RX ADMIN — INFLUENZA VIRUS VACCINE 0.5 MILLILITER(S): 15; 15; 15; 15 SUSPENSION INTRAMUSCULAR at 13:30

## 2017-11-11 NOTE — PROGRESS NOTE ADULT - SUBJECTIVE AND OBJECTIVE BOX
CARDIOLOGY     PROGRESS  NOTE   ________________________________________________    CHIEF COMPLAINT:Patient is a 85y old  Female who presents with a chief complaint of Slurring of speech (06 Nov 2017 10:42)    	  REVIEW OF SYSTEMS:  CONSTITUTIONAL: No fever, weight loss, or fatigue  EYES: No eye pain, visual disturbances, or discharge  ENT:  No difficulty hearing, tinnitus, vertigo; No sinus or throat pain  NECK: No pain or stiffness  RESPIRATORY: No cough, wheezing, chills or hemoptysis; No Shortness of Breath  CARDIOVASCULAR: No chest pain, palpitations, passing out, dizziness, or leg swelling  GASTROINTESTINAL: No abdominal or epigastric pain. No nausea, vomiting, or hematemesis; No diarrhea or constipation. No melena or hematochezia.  GENITOURINARY: No dysuria, frequency, hematuria, or incontinence  NEUROLOGICAL: No headaches, memory loss, loss of strength, numbness, or tremors  SKIN: No itching, burning, rashes, or lesions   LYMPH Nodes: No enlarged glands  ENDOCRINE: No heat or cold intolerance; No hair loss  MUSCULOSKELETAL: No joint pain or swelling; No muscle, back, or extremity pain  PSYCHIATRIC: No depression, anxiety, mood swings, or difficulty sleeping  HEME/LYMPH: No easy bruising, or bleeding gums  ALLERGY AND IMMUNOLOGIC: No hives or eczema	    [ ] All others negative	  [ ] Unable to obtain    PHYSICAL EXAM:  T(C): 36.6 (11-11-17 @ 05:54), Max: 36.6 (11-10-17 @ 21:10)  HR: 71 (11-11-17 @ 05:54) (71 - 96)  BP: 151/90 (11-11-17 @ 05:54) (136/64 - 151/90)  RR: 17 (11-11-17 @ 05:54) (17 - 17)  SpO2: 100% (11-11-17 @ 05:54) (100% - 100%)  Wt(kg): --  I&O's Summary      Appearance: Normal	  HEENT:   Normal oral mucosa, PERRL, EOMI	  Lymphatic: No lymphadenopathy  Cardiovascular: Normal S1 S2, No JVD, No murmurs, No edema  Respiratory: Lungs clear to auscultation	  Psychiatry: A & O x 3, Mood & affect appropriate  Gastrointestinal:  Soft, Non-tender, + BS	  Skin: No rashes, No ecchymoses, No cyanosis	  Neurologic: Non-focal  Extremities: Normal range of motion, No clubbing, cyanosis or edema  Vascular: Peripheral pulses palpable 2+ bilaterally    MEDICATIONS  (STANDING):  atorvastatin 40 milliGRAM(s) Oral at bedtime  fenofibrate Tablet 145 milliGRAM(s) Oral daily  influenza   Vaccine 0.5 milliLiter(s) IntraMuscular once  metoprolol     tartrate 25 milliGRAM(s) Oral two times a day  pantoprazole    Tablet 40 milliGRAM(s) Oral before breakfast      TELEMETRY: a.fib max pause 2 seconds	    ECG:  	  RADIOLOGY:  OTHER: 	  	  LABS:	 	    CARDIAC MARKERS:                                11.9   4.4   )-----------( 183      ( 10 Nov 2017 07:17 )             37.8     11-10    142  |  109<H>  |  31<H>  ----------------------------<  86  3.9   |  26  |  0.96    Ca    8.3<L>      10 Nov 2017 07:17      proBNP:   Lipid Profile: Cholesterol 105  LDL 41  HDL 51  TG 63    HgA1c: Hemoglobin A1C, Whole Blood: 5.4 % (11-03 @ 09:31)    TSH: Thyroid Stimulating Hormone, Serum: 6.49 uU/mL (11-03 @ 06:39)    PT/INR - ( 11 Nov 2017 08:15 )   PT: 36.7 sec;   INR: 3.29 ratio         PTT - ( 10 Nov 2017 07:17 )  PTT:45.7 sec      Assessment and plan  ---------------------------  85 yr old  [place] speaking female with significant PMHx of HTN, A fib (on Xarelto was brought to Ed by family for slurred speech.  1.MRI-+ CVA, probable xarelto failure, coumadin until INR 2.0-3.0.  2.Afib-lopressor.  3.Statin.  4..PPI.  5.Stress test in 4-6 weeks.  6. f/u hr closely

## 2017-11-11 NOTE — PROGRESS NOTE ADULT - SUBJECTIVE AND OBJECTIVE BOX
Patient is a 85y old  Female who presents with a chief complaint of Slurring of speech (06 Nov 2017 10:42)    pt seen in tele [x  ], reg med floor [   ], sitting on bed [x  ], chair at bedside [   ], a+o x3 [ x ],   lethargic [  ],    nad [x  ]        Allergies    No Known Allergies        Vitals    T(F): 97.8 (11-11-17 @ 05:54), Max: 97.8 (11-10-17 @ 21:10)  HR: 71 (11-11-17 @ 05:54) (71 - 96)  BP: 151/90 (11-11-17 @ 05:54) (136/64 - 151/90)  RR: 17 (11-11-17 @ 05:54) (17 - 17)  SpO2: 100% (11-11-17 @ 05:54) (100% - 100%)  Wt(kg): --  CAPILLARY BLOOD GLUCOSE          Labs                          11.9   4.4   )-----------( 183      ( 10 Nov 2017 07:17 )             37.8       11-10    142  |  109<H>  |  31<H>  ----------------------------<  86  3.9   |  26  |  0.96    Ca    8.3<L>      10 Nov 2017 07:17                  Radiology Results      Meds    MEDICATIONS  (STANDING):  atorvastatin 40 milliGRAM(s) Oral at bedtime  fenofibrate Tablet 145 milliGRAM(s) Oral daily  influenza   Vaccine 0.5 milliLiter(s) IntraMuscular once  metoprolol     tartrate 25 milliGRAM(s) Oral two times a day  pantoprazole    Tablet 40 milliGRAM(s) Oral before breakfast      MEDICATIONS  (PRN):      Physical Exam    Neuro :  no focal deficits  Respiratory: CTA B/L  CV: RRR, S1S2, no murmurs,   Abdominal: Soft, NT, ND +BS,  Extremities: No edema, + peripheral pulses    ASSESSMENT    s/p Slurred speech 2nd to acute/ subacute lacunar infarct in the right corona radiata/centrum semiovale likely 2nd to xarelto failure  h/o HTN (hypertension)  A fib   No significant past surgical history      PLAN    mri/ mra result with acute/ subacute cva noted  neuro f/u   cont coumadin 3mg   f/u inr in am and dose coumadin accordingly   cardio f/u noted  stress test in 4-6 wks  cont current meds  d/c plan when inr 2-3 Patient is a 85y old  Female who presents with a chief complaint of Slurring of speech (06 Nov 2017 10:42)    pt seen in tele [x  ], reg med floor [   ], sitting on bed [x  ], chair at bedside [   ], a+o x3 [ x ],   lethargic [  ],    nad [x  ]        Allergies    No Known Allergies        Vitals    T(F): 97.8 (11-11-17 @ 05:54), Max: 97.8 (11-10-17 @ 21:10)  HR: 71 (11-11-17 @ 05:54) (71 - 96)  BP: 151/90 (11-11-17 @ 05:54) (136/64 - 151/90)  RR: 17 (11-11-17 @ 05:54) (17 - 17)  SpO2: 100% (11-11-17 @ 05:54) (100% - 100%)  Wt(kg): --  CAPILLARY BLOOD GLUCOSE          Labs                          11.9   4.4   )-----------( 183      ( 10 Nov 2017 07:17 )             37.8       11-10    142  |  109<H>  |  31<H>  ----------------------------<  86  3.9   |  26  |  0.96    Ca    8.3<L>      10 Nov 2017 07:17                  Radiology Results      Meds    MEDICATIONS  (STANDING):  atorvastatin 40 milliGRAM(s) Oral at bedtime  fenofibrate Tablet 145 milliGRAM(s) Oral daily  influenza   Vaccine 0.5 milliLiter(s) IntraMuscular once  metoprolol     tartrate 25 milliGRAM(s) Oral two times a day  pantoprazole    Tablet 40 milliGRAM(s) Oral before breakfast      MEDICATIONS  (PRN):      Physical Exam    Neuro :  no focal deficits  Respiratory: CTA B/L  CV: RRR, S1S2, no murmurs,   Abdominal: Soft, NT, ND +BS,  Extremities: No edema, + peripheral pulses    ASSESSMENT    s/p Slurred speech 2nd to acute/ subacute lacunar infarct in the right corona radiata/centrum semiovale likely 2nd to xarelto failure  h/o HTN (hypertension)  A fib   No significant past surgical history      PLAN    mri/ mra result with acute/ subacute cva noted  neuro f/u   hold coumadin 2nd to supratherapeutic inr   f/u inr in am and dose coumadin accordingly   cardio f/u noted  stress test in 4-6 wks  cont current meds  d/c plan when inr 2-3

## 2017-11-12 LAB
INR BLD: 4.97 RATIO — HIGH (ref 0.88–1.16)
PROTHROM AB SERPL-ACNC: 56 SEC — HIGH (ref 9.8–12.7)

## 2017-11-12 RX ADMIN — PANTOPRAZOLE SODIUM 40 MILLIGRAM(S): 20 TABLET, DELAYED RELEASE ORAL at 06:41

## 2017-11-12 RX ADMIN — Medication 25 MILLIGRAM(S): at 17:33

## 2017-11-12 RX ADMIN — Medication 145 MILLIGRAM(S): at 11:45

## 2017-11-12 RX ADMIN — ATORVASTATIN CALCIUM 40 MILLIGRAM(S): 80 TABLET, FILM COATED ORAL at 21:41

## 2017-11-12 RX ADMIN — Medication 25 MILLIGRAM(S): at 06:41

## 2017-11-12 NOTE — PROGRESS NOTE ADULT - ASSESSMENT
84 y/o [facility] female ( ID [id number]) with significant PMHx of HTN, A fib (on Xarelto) was brought to Ed by family for slurred speech x yesterday morning. Pt was admitted for Stroke.

## 2017-11-12 NOTE — PROGRESS NOTE ADULT - SUBJECTIVE AND OBJECTIVE BOX
CARDIOLOGY     PROGRESS  NOTE   ________________________________________________    CHIEF COMPLAINT:Patient is a 85y old  Female who presents with a chief complaint of Slurring of speech (06 Nov 2017 10:42)    	  REVIEW OF SYSTEMS:  CONSTITUTIONAL: No fever, weight loss, or fatigue  EYES: No eye pain, visual disturbances, or discharge  ENT:  No difficulty hearing, tinnitus, vertigo; No sinus or throat pain  NECK: No pain or stiffness  RESPIRATORY: No cough, wheezing, chills or hemoptysis; No Shortness of Breath  CARDIOVASCULAR: No chest pain, palpitations, passing out, dizziness, or leg swelling  GASTROINTESTINAL: No abdominal or epigastric pain. No nausea, vomiting, or hematemesis; No diarrhea or constipation. No melena or hematochezia.  GENITOURINARY: No dysuria, frequency, hematuria, or incontinence  NEUROLOGICAL: No headaches, memory loss, loss of strength, numbness, or tremors  SKIN: No itching, burning, rashes, or lesions   LYMPH Nodes: No enlarged glands  ENDOCRINE: No heat or cold intolerance; No hair loss  MUSCULOSKELETAL: No joint pain or swelling; No muscle, back, or extremity pain  PSYCHIATRIC: No depression, anxiety, mood swings, or difficulty sleeping  HEME/LYMPH: No easy bruising, or bleeding gums  ALLERGY AND IMMUNOLOGIC: No hives or eczema	    [ ] All others negative	  [ ] Unable to obtain    PHYSICAL EXAM:  T(C): 36.4 (11-12-17 @ 09:58), Max: 37.4 (11-11-17 @ 17:24)  HR: 85 (11-12-17 @ 09:58) (60 - 88)  BP: 123/80 (11-12-17 @ 09:58) (118/79 - 155/77)  RR: 17 (11-12-17 @ 09:58) (15 - 18)  SpO2: 99% (11-12-17 @ 09:58) (99% - 100%)  Wt(kg): --  I&O's Summary      Appearance: Normal	  HEENT:   Normal oral mucosa, PERRL, EOMI	  Lymphatic: No lymphadenopathy  Cardiovascular: Normal S1 S2, No JVD, No murmurs, No edema  Respiratory: Lungs clear to auscultation	  Psychiatry: A & O x 3, Mood & affect appropriate  Gastrointestinal:  Soft, Non-tender, + BS	  Skin: No rashes, No ecchymoses, No cyanosis	  Neurologic: Non-focal  Extremities: Normal range of motion, No clubbing, cyanosis or edema  Vascular: Peripheral pulses palpable 2+ bilaterally    MEDICATIONS  (STANDING):  atorvastatin 40 milliGRAM(s) Oral at bedtime  fenofibrate Tablet 145 milliGRAM(s) Oral daily  metoprolol     tartrate 25 milliGRAM(s) Oral two times a day  pantoprazole    Tablet 40 milliGRAM(s) Oral before breakfast      TELEMETRY: 	    ECG:  	  RADIOLOGY:  OTHER: 	  	  LABS:	 	    CARDIAC MARKERS:                  proBNP:   Lipid Profile: Cholesterol 105  LDL 41  HDL 51  TG 63    HgA1c: Hemoglobin A1C, Whole Blood: 5.4 % (11-03 @ 09:31)    TSH: Thyroid Stimulating Hormone, Serum: 6.49 uU/mL (11-03 @ 06:39)    PT/INR - ( 12 Nov 2017 07:24 )   PT: 56.0 sec;   INR: 4.97 ratio               Assessment and plan  ---------------------------  85 yr old  [place] speaking female with significant PMHx of HTN, A fib (on Xarelto was brought to Ed by family for slurred speech.  1.MRI-+ CVA, probable xarelto failure, coumadin until INR 2.0-3.0.  2.Afib-lopressor.  3.Statin.  4..PPI.  5.Stress test in 4-6 weeks.  6. f/u hr closely

## 2017-11-12 NOTE — PROGRESS NOTE ADULT - SUBJECTIVE AND OBJECTIVE BOX
PGY1 Note discussed with supervising resident and primary attending.    Patient is a 85y old  Female who presents with a chief complaint of Slurring of speech (06 Nov 2017 10:42)      INTERVAL HPI/OVERNIGHT EVENTS: No overnight events    MEDICATIONS  (STANDING):  atorvastatin 40 milliGRAM(s) Oral at bedtime  fenofibrate Tablet 145 milliGRAM(s) Oral daily  metoprolol     tartrate 25 milliGRAM(s) Oral two times a day  pantoprazole    Tablet 40 milliGRAM(s) Oral before breakfast    MEDICATIONS  (PRN):      Allergies    No Known Allergies    Intolerances        REVIEW OF SYSTEMS:  CONSTITUTIONAL: No fever, weight loss, or fatigue  RESPIRATORY: No cough, wheezing, chills or hemoptysis; No shortness of breath  CARDIOVASCULAR: No chest pain, palpitations, dizziness, or leg swelling  GASTROINTESTINAL: No abdominal or epigastric pain. No nausea, vomiting, or hematemesis; No diarrhea or constipation. No melena or hematochezia.  NEUROLOGICAL: No headaches, memory loss, loss of strength, numbness, or tremors  SKIN: No itching, burning, rashes, or lesions     Vital Signs Last 24 Hrs  T(C): 36.5 (12 Nov 2017 05:11), Max: 37.4 (11 Nov 2017 17:24)  T(F): 97.7 (12 Nov 2017 05:11), Max: 99.4 (11 Nov 2017 17:24)  HR: 71 (12 Nov 2017 05:11) (60 - 88)  BP: 155/77 (12 Nov 2017 05:11) (118/79 - 155/77)  BP(mean): --  RR: 17 (12 Nov 2017 05:11) (15 - 18)  SpO2: 99% (12 Nov 2017 05:11) (99% - 100%)    PHYSICAL EXAM:  GENERAL: NAD, well-groomed, well-developed  HEAD:  Atraumatic, Normocephalic  EYES: EOMI, PERRLA, conjunctiva and sclera clear  NECK: Supple, No JVD, Normal thyroid  CHEST/LUNG: Clear to percussion bilaterally; No rales, rhonchi, wheezing, or rubs  HEART: Regular rate and rhythm; No murmurs, rubs, or gallops  ABDOMEN: Soft, Nontender, Nondistended; Bowel sounds present  NERVOUS SYSTEM:  Alert & Oriented X3, Good concentration; Motor Strength 5/5 B/L   EXTREMITIES:  2+ Peripheral Pulses, No clubbing, cyanosis, or edema  SKIN;    LABS:          PT/INR - ( 12 Nov 2017 07:24 )   PT: 56.0 sec;   INR: 4.97 ratio             CAPILLARY BLOOD GLUCOSE          RADIOLOGY & ADDITIONAL TESTS:    Imaging Personally Reviewed:  [x ] YES  [ ] NO    Consultant(s) Notes Reviewed:  [x ] YES  [ ] NO

## 2017-11-12 NOTE — PROGRESS NOTE ADULT - PROBLEM SELECTOR PLAN 1
-Patient admitted to tele with concern for stroke  -MRI pos for acute lacunar stroke to the rt corona radiata  -EKG: A fib at 82 bpm  -c/w  statin  -c/w BP meds as symptoms started over 48hrs prior  -Echo: EF 50-55%, GIIDD  -passed speech and swallow eval   -PT eval to home  -hold all AC today  -monitor PT/INR , 4.97this am  -HOLD WARFARIN FOR NOW DUE TO SUPRATHERAPEUTIC INR  -Cardio Dr Knight.   -Neurology: Dr Damon  -d/c plan if IRN 2-3

## 2017-11-13 LAB
ANION GAP SERPL CALC-SCNC: 6 MMOL/L — SIGNIFICANT CHANGE UP (ref 5–17)
BUN SERPL-MCNC: 29 MG/DL — HIGH (ref 7–18)
CALCIUM SERPL-MCNC: 8.3 MG/DL — LOW (ref 8.4–10.5)
CHLORIDE SERPL-SCNC: 109 MMOL/L — HIGH (ref 96–108)
CO2 SERPL-SCNC: 28 MMOL/L — SIGNIFICANT CHANGE UP (ref 22–31)
CREAT SERPL-MCNC: 0.95 MG/DL — SIGNIFICANT CHANGE UP (ref 0.5–1.3)
GLUCOSE SERPL-MCNC: 86 MG/DL — SIGNIFICANT CHANGE UP (ref 70–99)
HCT VFR BLD CALC: 36.7 % — SIGNIFICANT CHANGE UP (ref 34.5–45)
HGB BLD-MCNC: 11.7 G/DL — SIGNIFICANT CHANGE UP (ref 11.5–15.5)
INR BLD: 5.22 RATIO — CRITICAL HIGH (ref 0.88–1.16)
MCHC RBC-ENTMCNC: 31.1 PG — SIGNIFICANT CHANGE UP (ref 27–34)
MCHC RBC-ENTMCNC: 31.8 GM/DL — LOW (ref 32–36)
MCV RBC AUTO: 97.7 FL — SIGNIFICANT CHANGE UP (ref 80–100)
PLATELET # BLD AUTO: 166 K/UL — SIGNIFICANT CHANGE UP (ref 150–400)
POTASSIUM SERPL-MCNC: 3.9 MMOL/L — SIGNIFICANT CHANGE UP (ref 3.5–5.3)
POTASSIUM SERPL-SCNC: 3.9 MMOL/L — SIGNIFICANT CHANGE UP (ref 3.5–5.3)
PROTHROM AB SERPL-ACNC: 58.9 SEC — HIGH (ref 9.8–12.7)
RBC # BLD: 3.75 M/UL — LOW (ref 3.8–5.2)
RBC # FLD: 13.8 % — SIGNIFICANT CHANGE UP (ref 10.3–14.5)
SODIUM SERPL-SCNC: 143 MMOL/L — SIGNIFICANT CHANGE UP (ref 135–145)
WBC # BLD: 4.1 K/UL — SIGNIFICANT CHANGE UP (ref 3.8–10.5)
WBC # FLD AUTO: 4.1 K/UL — SIGNIFICANT CHANGE UP (ref 3.8–10.5)

## 2017-11-13 RX ADMIN — PANTOPRAZOLE SODIUM 40 MILLIGRAM(S): 20 TABLET, DELAYED RELEASE ORAL at 05:47

## 2017-11-13 RX ADMIN — ATORVASTATIN CALCIUM 40 MILLIGRAM(S): 80 TABLET, FILM COATED ORAL at 21:41

## 2017-11-13 RX ADMIN — Medication 25 MILLIGRAM(S): at 17:28

## 2017-11-13 RX ADMIN — Medication 145 MILLIGRAM(S): at 11:19

## 2017-11-13 RX ADMIN — Medication 25 MILLIGRAM(S): at 05:47

## 2017-11-13 NOTE — PROGRESS NOTE ADULT - PROBLEM SELECTOR PROBLEM 1
Cerebrovascular accident (CVA) due to embolism of right middle cerebral artery
Slurred speech

## 2017-11-13 NOTE — PROGRESS NOTE ADULT - PROBLEM SELECTOR PROBLEM 2
HTN (hypertension)
Chronic atrial fibrillation
HTN (hypertension)

## 2017-11-13 NOTE — PROGRESS NOTE ADULT - ASSESSMENT
85 yr old  [facility] speaking female with significant PMHx of HTN, A fib (on Xarelto was brought to Ed by family for slurred speech.  1.MRI-+ CVA, probable xarelto failure, hold coumadin due to elevated inr..  2.Afib-lopressor.  3.Statin.  4..PPI.  5.Stress test in 4-6 weeks.

## 2017-11-13 NOTE — CHART NOTE - NSCHARTNOTEFT_GEN_A_CORE
Paged by RN pt's INR 5.22 this AM.    Last coumadin 3mg was dosed 11/10/17.     Primary team to follow.

## 2017-11-13 NOTE — PROGRESS NOTE ADULT - PROBLEM SELECTOR PROBLEM 4
Prophylactic measure

## 2017-11-13 NOTE — PROGRESS NOTE ADULT - SUBJECTIVE AND OBJECTIVE BOX
Patient is a 85y old  Female who presents with a chief complaint of Slurring of speech (06 Nov 2017 10:42)    pt seen in tele [x  ], reg med floor [   ], sitting on bed [x  ], chair at bedside [   ], a+o x3 [ x ],   lethargic [  ],    nad [x  ]      Allergies    No Known Allergies        Vitals    T(F): 97.7 (11-13-17 @ 05:25), Max: 100 (11-12-17 @ 17:19)  HR: 73 (11-13-17 @ 05:25) (60 - 85)  BP: 151/75 (11-13-17 @ 05:25) (118/70 - 154/81)  RR: 15 (11-13-17 @ 05:25) (15 - 19)  SpO2: 100% (11-13-17 @ 05:25) (99% - 100%)  Wt(kg): --  CAPILLARY BLOOD GLUCOSE          Labs            Radiology Results      Meds    MEDICATIONS  (STANDING):  atorvastatin 40 milliGRAM(s) Oral at bedtime  fenofibrate Tablet 145 milliGRAM(s) Oral daily  metoprolol     tartrate 25 milliGRAM(s) Oral two times a day  pantoprazole    Tablet 40 milliGRAM(s) Oral before breakfast      MEDICATIONS  (PRN):      Physical Exam    Neuro :  no focal deficits  Respiratory: CTA B/L  CV: RRR, S1S2, no murmurs,   Abdominal: Soft, NT, ND +BS,  Extremities: No edema, + peripheral pulses    ASSESSMENT    s/p Slurred speech 2nd to acute/ subacute lacunar infarct in the right corona radiata/centrum semiovale likely 2nd to xarelto failure  h/o HTN (hypertension)  A fib   No significant past surgical history      PLAN    mri/ mra result with acute/ subacute cva noted  neuro f/u   coumadin on hold 2nd to supratherapeutic inr   f/u inr and dose coumadin accordingly   cardio f/u  stress test in 4-6 wks  cont current meds  d/c plan when inr 2-3

## 2017-11-13 NOTE — PROGRESS NOTE ADULT - SUBJECTIVE AND OBJECTIVE BOX
PGY1 Note discussed with supervising resident and primary attending.    Patient is a 85y old  Female who presents with a chief complaint of Slurring of speech (06 Nov 2017 10:42)      INTERVAL HPI/OVERNIGHT EVENTS: No overnight events.  MEDICATIONS  (STANDING):  atorvastatin 40 milliGRAM(s) Oral at bedtime  fenofibrate Tablet 145 milliGRAM(s) Oral daily  metoprolol     tartrate 25 milliGRAM(s) Oral two times a day  pantoprazole    Tablet 40 milliGRAM(s) Oral before breakfast    MEDICATIONS  (PRN):      Allergies    No Known Allergies    Intolerances        REVIEW OF SYSTEMS:  CONSTITUTIONAL: No fever, weight loss, or fatigue  RESPIRATORY: No cough, wheezing, chills or hemoptysis; No shortness of breath  CARDIOVASCULAR: No chest pain, palpitations, dizziness, or leg swelling  GASTROINTESTINAL: No abdominal or epigastric pain. No nausea, vomiting, or hematemesis; No diarrhea or constipation. No melena or hematochezia.  NEUROLOGICAL: No headaches, memory loss, loss of strength, numbness, or tremors  SKIN: No itching, burning, rashes, or lesions     Vital Signs Last 24 Hrs  T(C): 36.4 (13 Nov 2017 13:35), Max: 37.8 (12 Nov 2017 17:19)  T(F): 97.6 (13 Nov 2017 13:35), Max: 100 (12 Nov 2017 17:19)  HR: 78 (13 Nov 2017 13:35) (60 - 80)  BP: 131/75 (13 Nov 2017 13:35) (129/59 - 154/81)  BP(mean): --  RR: 16 (13 Nov 2017 13:35) (15 - 19)  SpO2: 100% (13 Nov 2017 13:35) (99% - 100%)    PHYSICAL EXAM:  GENERAL: NAD, well-groomed, well-developed  HEAD:  Atraumatic, Normocephalic  EYES: EOMI, PERRLA, conjunctiva and sclera clear  NECK: Supple, No JVD, Normal thyroid  CHEST/LUNG: Clear to percussion bilaterally; No rales, rhonchi, wheezing, or rubs  HEART: Regular rate and rhythm; No murmurs, rubs, or gallops  ABDOMEN: Soft, Nontender, Nondistended; Bowel sounds present  NERVOUS SYSTEM:  Alert & Oriented X3, Good concentration; Motor Strength 5/5 B/L   EXTREMITIES:  2+ Peripheral Pulses, No clubbing, cyanosis, or edema  SKIN;    LABS:                        11.7   4.1   )-----------( 166      ( 13 Nov 2017 05:56 )             36.7     11-13    143  |  109<H>  |  29<H>  ----------------------------<  86  3.9   |  28  |  0.95    Ca    8.3<L>      13 Nov 2017 05:56      PT/INR - ( 13 Nov 2017 05:56 )   PT: 58.9 sec;   INR: 5.22 ratio             CAPILLARY BLOOD GLUCOSE          RADIOLOGY & ADDITIONAL TESTS:    Imaging Personally Reviewed:  [x ] YES  [ ] NO    Consultant(s) Notes Reviewed:  [x ] YES  [ ] NO

## 2017-11-13 NOTE — PROGRESS NOTE ADULT - SUBJECTIVE AND OBJECTIVE BOX
CHIEF COMPLAINT:Patient is a 85y old  Female who presents with a chief complaint of Slurring of speech. Pt appears comfortable.    	  REVIEW OF SYSTEMS:  CONSTITUTIONAL: No fever, weight loss, or fatigue  EYES: No eye pain, visual disturbances, or discharge  ENT:  No difficulty hearing, tinnitus, vertigo; No sinus or throat pain  NECK: No pain or stiffness  RESPIRATORY: No cough, wheezing, chills or hemoptysis; No Shortness of Breath  CARDIOVASCULAR: No chest pain, palpitations, passing out, dizziness, or leg swelling  GASTROINTESTINAL: No abdominal or epigastric pain. No nausea, vomiting, or hematemesis; No diarrhea or constipation. No melena or hematochezia.  GENITOURINARY: No dysuria, frequency, hematuria, or incontinence  NEUROLOGICAL: No headaches, memory loss, loss of strength, numbness, or tremors  SKIN: No itching, burning, rashes, or lesions   LYMPH Nodes: No enlarged glands  ENDOCRINE: No heat or cold intolerance; No hair loss  MUSCULOSKELETAL: No joint pain or swelling; No muscle, back, or extremity pain  PSYCHIATRIC: No depression, anxiety, mood swings, or difficulty sleeping  HEME/LYMPH: No easy bruising, or bleeding gums  ALLERGY AND IMMUNOLOGIC: No hives or eczema	      PHYSICAL EXAM:  T(C): 36.5 (11-13-17 @ 05:25), Max: 37.8 (11-12-17 @ 17:19)  HR: 73 (11-13-17 @ 05:25) (60 - 80)  BP: 151/75 (11-13-17 @ 05:25) (118/70 - 154/81)  RR: 15 (11-13-17 @ 05:25) (15 - 19)  SpO2: 100% (11-13-17 @ 05:25) (99% - 100%)      Appearance: Normal	  HEENT:   Normal oral mucosa, PERRL, EOMI	  Lymphatic: No lymphadenopathy  Cardiovascular: Normal S1 S2, No JVD, No murmurs, No edema  Respiratory: Lungs clear to auscultation	  Psychiatry: A & O x 3, Mood & affect appropriate  Gastrointestinal:  Soft, Non-tender, + BS	  Skin: No rashes, No ecchymoses, No cyanosis	  Neurologic: Non-focal  Extremities: Normal range of motion, No clubbing, cyanosis or edema  Vascular: Peripheral pulses palpable 2+ bilaterally    MEDICATIONS  (STANDING):  atorvastatin 40 milliGRAM(s) Oral at bedtime  fenofibrate Tablet 145 milliGRAM(s) Oral daily  metoprolol     tartrate 25 milliGRAM(s) Oral two times a day  pantoprazole    Tablet 40 milliGRAM(s) Oral before breakfast        	  LABS:	 	    INR 5.22      Lipid Profile: Cholesterol 105  LDL 41  HDL 51  TG 63    HgA1c: Hemoglobin A1C, Whole Blood: 5.4 % (11-03 @ 09:31)    TSH: Thyroid Stimulating Hormone, Serum: 6.49 uU/mL (11-03 @ 06:39)

## 2017-11-13 NOTE — PROGRESS NOTE ADULT - ASSESSMENT
86 y/o [facility] female ( ID [id number]) with significant PMHx of HTN, A fib (on Xarelto) was brought to Ed by family for slurred speech x yesterday morning. Pt was admitted for Stroke.

## 2017-11-13 NOTE — PROGRESS NOTE ADULT - PROBLEM SELECTOR PLAN 1
-Patient admitted to tele with concern for stroke  -MRI pos for acute lacunar stroke to the rt corona radiata  -EKG: A fib at 82 bpm  -c/w  statin  -c/w BP meds as symptoms started over 48hrs prior  -Echo: EF 50-55%, GIIDD  -passed speech and swallow eval   -PT eval to home  -hold all AC today  -monitor PT/INR , 5.2 this am  -HOLD WARFARIN FOR NOW DUE TO SUPRATHERAPEUTIC INR  -Cardio Dr Knight.   -Neurology: Dr Damon  -d/c plan if IRN 2-3

## 2017-11-13 NOTE — PROGRESS NOTE ADULT - PROBLEM SELECTOR PROBLEM 3
Atrial fibrillation

## 2017-11-14 VITALS
SYSTOLIC BLOOD PRESSURE: 149 MMHG | TEMPERATURE: 98 F | DIASTOLIC BLOOD PRESSURE: 75 MMHG | RESPIRATION RATE: 17 BRPM | OXYGEN SATURATION: 100 % | HEART RATE: 72 BPM

## 2017-11-14 LAB
ANION GAP SERPL CALC-SCNC: 7 MMOL/L — SIGNIFICANT CHANGE UP (ref 5–17)
BUN SERPL-MCNC: 30 MG/DL — HIGH (ref 7–18)
CALCIUM SERPL-MCNC: 8.4 MG/DL — SIGNIFICANT CHANGE UP (ref 8.4–10.5)
CHLORIDE SERPL-SCNC: 111 MMOL/L — HIGH (ref 96–108)
CO2 SERPL-SCNC: 24 MMOL/L — SIGNIFICANT CHANGE UP (ref 22–31)
CREAT SERPL-MCNC: 1.06 MG/DL — SIGNIFICANT CHANGE UP (ref 0.5–1.3)
GLUCOSE SERPL-MCNC: 86 MG/DL — SIGNIFICANT CHANGE UP (ref 70–99)
HCT VFR BLD CALC: 38.4 % — SIGNIFICANT CHANGE UP (ref 34.5–45)
HGB BLD-MCNC: 12.1 G/DL — SIGNIFICANT CHANGE UP (ref 11.5–15.5)
INR BLD: 3.3 RATIO — HIGH (ref 0.88–1.16)
MCHC RBC-ENTMCNC: 31.4 GM/DL — LOW (ref 32–36)
MCHC RBC-ENTMCNC: 31.4 PG — SIGNIFICANT CHANGE UP (ref 27–34)
MCV RBC AUTO: 99.9 FL — SIGNIFICANT CHANGE UP (ref 80–100)
PLATELET # BLD AUTO: 173 K/UL — SIGNIFICANT CHANGE UP (ref 150–400)
POTASSIUM SERPL-MCNC: 4.2 MMOL/L — SIGNIFICANT CHANGE UP (ref 3.5–5.3)
POTASSIUM SERPL-SCNC: 4.2 MMOL/L — SIGNIFICANT CHANGE UP (ref 3.5–5.3)
PROTHROM AB SERPL-ACNC: 36.9 SEC — HIGH (ref 9.8–12.7)
RBC # BLD: 3.84 M/UL — SIGNIFICANT CHANGE UP (ref 3.8–5.2)
RBC # FLD: 14 % — SIGNIFICANT CHANGE UP (ref 10.3–14.5)
SODIUM SERPL-SCNC: 142 MMOL/L — SIGNIFICANT CHANGE UP (ref 135–145)
WBC # BLD: 4.5 K/UL — SIGNIFICANT CHANGE UP (ref 3.8–10.5)
WBC # FLD AUTO: 4.5 K/UL — SIGNIFICANT CHANGE UP (ref 3.8–10.5)

## 2017-11-14 PROCEDURE — 70549 MR ANGIOGRAPH NECK W/O&W/DYE: CPT

## 2017-11-14 PROCEDURE — 70450 CT HEAD/BRAIN W/O DYE: CPT

## 2017-11-14 PROCEDURE — 82962 GLUCOSE BLOOD TEST: CPT

## 2017-11-14 PROCEDURE — 70544 MR ANGIOGRAPHY HEAD W/O DYE: CPT

## 2017-11-14 PROCEDURE — 90686 IIV4 VACC NO PRSV 0.5 ML IM: CPT

## 2017-11-14 PROCEDURE — 71045 X-RAY EXAM CHEST 1 VIEW: CPT

## 2017-11-14 PROCEDURE — 82553 CREATINE MB FRACTION: CPT

## 2017-11-14 PROCEDURE — 92610 EVALUATE SWALLOWING FUNCTION: CPT

## 2017-11-14 PROCEDURE — 85730 THROMBOPLASTIN TIME PARTIAL: CPT

## 2017-11-14 PROCEDURE — 99285 EMERGENCY DEPT VISIT HI MDM: CPT | Mod: 25

## 2017-11-14 PROCEDURE — 83735 ASSAY OF MAGNESIUM: CPT

## 2017-11-14 PROCEDURE — 96374 THER/PROPH/DIAG INJ IV PUSH: CPT

## 2017-11-14 PROCEDURE — 84484 ASSAY OF TROPONIN QUANT: CPT

## 2017-11-14 PROCEDURE — 80061 LIPID PANEL: CPT

## 2017-11-14 PROCEDURE — 85027 COMPLETE CBC AUTOMATED: CPT

## 2017-11-14 PROCEDURE — 93005 ELECTROCARDIOGRAM TRACING: CPT

## 2017-11-14 PROCEDURE — 84100 ASSAY OF PHOSPHORUS: CPT

## 2017-11-14 PROCEDURE — 80048 BASIC METABOLIC PNL TOTAL CA: CPT

## 2017-11-14 PROCEDURE — 85610 PROTHROMBIN TIME: CPT

## 2017-11-14 PROCEDURE — 93306 TTE W/DOPPLER COMPLETE: CPT

## 2017-11-14 PROCEDURE — 84443 ASSAY THYROID STIM HORMONE: CPT

## 2017-11-14 PROCEDURE — 36415 COLL VENOUS BLD VENIPUNCTURE: CPT

## 2017-11-14 PROCEDURE — 70551 MRI BRAIN STEM W/O DYE: CPT

## 2017-11-14 PROCEDURE — 83036 HEMOGLOBIN GLYCOSYLATED A1C: CPT

## 2017-11-14 PROCEDURE — 82550 ASSAY OF CK (CPK): CPT

## 2017-11-14 RX ORDER — WARFARIN SODIUM 2.5 MG/1
1.5 TABLET ORAL
Qty: 10.5 | Refills: 0
Start: 2017-11-14 | End: 2017-11-21

## 2017-11-14 RX ORDER — WARFARIN SODIUM 2.5 MG/1
1.5 TABLET ORAL
Qty: 4.5 | Refills: 0 | OUTPATIENT
Start: 2017-11-14 | End: 2017-11-17

## 2017-11-14 RX ADMIN — Medication 145 MILLIGRAM(S): at 11:36

## 2017-11-14 RX ADMIN — PANTOPRAZOLE SODIUM 40 MILLIGRAM(S): 20 TABLET, DELAYED RELEASE ORAL at 05:49

## 2017-11-14 RX ADMIN — Medication 25 MILLIGRAM(S): at 05:49

## 2017-11-14 NOTE — PROGRESS NOTE ADULT - SUBJECTIVE AND OBJECTIVE BOX
CHIEF COMPLAINT: Patient is a 85y old  Female who presents with a chief complaint of Slurring of speech. Pt appears comfortable.    	  REVIEW OF SYSTEMS:  CONSTITUTIONAL: No fever, weight loss, or fatigue  EYES: No eye pain, visual disturbances, or discharge  ENT:  No difficulty hearing, tinnitus, vertigo; No sinus or throat pain  NECK: No pain or stiffness  RESPIRATORY: No cough, wheezing, chills or hemoptysis; No Shortness of Breath  CARDIOVASCULAR: No chest pain, palpitations, passing out, dizziness, or leg swelling  GASTROINTESTINAL: No abdominal or epigastric pain. No nausea, vomiting, or hematemesis; No diarrhea or constipation. No melena or hematochezia.  GENITOURINARY: No dysuria, frequency, hematuria, or incontinence  NEUROLOGICAL: No headaches, memory loss, loss of strength, numbness, or tremors  SKIN: No itching, burning, rashes, or lesions   LYMPH Nodes: No enlarged glands  ENDOCRINE: No heat or cold intolerance; No hair loss  MUSCULOSKELETAL: No joint pain or swelling; No muscle, back, or extremity pain  PSYCHIATRIC: No depression, anxiety, mood swings, or difficulty sleeping  HEME/LYMPH: No easy bruising, or bleeding gums  ALLERGY AND IMMUNOLOGIC: No hives or eczema	      PHYSICAL EXAM:  T(C): 36.8 (11-14-17 @ 05:32), Max: 36.8 (11-14-17 @ 02:40)  HR: 72 (11-14-17 @ 05:32) (72 - 84)  BP: 149/75 (11-14-17 @ 05:32) (129/59 - 149/75)  RR: 17 (11-14-17 @ 05:32) (16 - 18)  SpO2: 100% (11-14-17 @ 05:32) (100% - 100%)      Appearance: Normal	  HEENT:   Normal oral mucosa, PERRL, EOMI	  Lymphatic: No lymphadenopathy  Cardiovascular: Normal S1 S2, No JVD, No murmurs, No edema  Respiratory: Lungs clear to auscultation	  Psychiatry: A & O x 3, Mood & affect appropriate  Gastrointestinal:  Soft, Non-tender, + BS	  Skin: No rashes, No ecchymoses, No cyanosis	  Neurologic: Non-focal  Extremities: Normal range of motion, No clubbing, cyanosis or edema  Vascular: Peripheral pulses palpable 2+ bilaterally    MEDICATIONS  (STANDING):  atorvastatin 40 milliGRAM(s) Oral at bedtime  fenofibrate Tablet 145 milliGRAM(s) Oral daily  metoprolol     tartrate 25 milliGRAM(s) Oral two times a day  pantoprazole    Tablet 40 milliGRAM(s) Oral before breakfast      	  LABS:	 	                         12.1   4.5   )-----------( 173      ( 14 Nov 2017 07:39 )             38.4     11-14    142  |  111<H>  |  30<H>  ----------------------------<  86  4.2   |  24  |  1.06    Ca    8.4      14 Nov 2017 07:39        Lipid Profile: Cholesterol 105  LDL 41  HDL 51  TG 63    HgA1c: Hemoglobin A1C, Whole Blood: 5.4 % (11-03 @ 09:31)    TSH: Thyroid Stimulating Hormone, Serum: 6.49 uU/mL (11-03 @ 06:39)    INR: 3.30: Please note: New Critical Value: 5.0 ratio as of 1/2/14. ratio (11.14.17 @ 07:39)

## 2017-11-14 NOTE — PROGRESS NOTE ADULT - SUBJECTIVE AND OBJECTIVE BOX
Patient is a 85y old  Female who presents with a chief complaint of Slurring of speech (06 Nov 2017 10:42)      pt seen in tele [x  ], reg med floor [   ], sitting on bed [x  ], chair at bedside [   ], a+o x3 [ x ],   lethargic [  ],    nad [x  ]    no signs or c/o bleeding. states feeling well      Allergies    No Known Allergies        Vitals    T(F): 98.2 (11-14-17 @ 05:32), Max: 98.2 (11-14-17 @ 02:40)  HR: 72 (11-14-17 @ 05:32) (72 - 84)  BP: 149/75 (11-14-17 @ 05:32) (129/59 - 149/75)  RR: 17 (11-14-17 @ 05:32) (16 - 18)  SpO2: 100% (11-14-17 @ 05:32) (100% - 100%)  Wt(kg): --  CAPILLARY BLOOD GLUCOSE          Labs                          12.1   4.5   )-----------( 173      ( 14 Nov 2017 07:39 )             38.4       11-14    142  |  111<H>  |  30<H>  ----------------------------<  86  4.2   |  24  |  1.06    Ca    8.4      14 Nov 2017 07:39      INR: 3.30: Please note: New Critical Value: 5.0 ratio as of 1/2/14. ratio (11.14.17 @ 07:39)        Radiology Results      Meds    MEDICATIONS  (STANDING):  atorvastatin 40 milliGRAM(s) Oral at bedtime  fenofibrate Tablet 145 milliGRAM(s) Oral daily  metoprolol     tartrate 25 milliGRAM(s) Oral two times a day  pantoprazole    Tablet 40 milliGRAM(s) Oral before breakfast      MEDICATIONS  (PRN):      Physical Exam    Neuro :  no focal deficits  Respiratory: CTA B/L  CV: RRR, S1S2, no murmurs,   Abdominal: Soft, NT, ND +BS,  Extremities: No edema, + peripheral pulses    ASSESSMENT    s/p Slurred speech 2nd to acute/ subacute lacunar infarct in the right corona radiata/centrum semiovale likely 2nd to xarelto failure  h/o HTN (hypertension)  A fib   No significant past surgical history      PLAN    mri/ mra result with acute/ subacute cva noted  neuro f/u   restart coumadin 1.5mg qhs   pt to f/u with pmd in 2 days to check inr and dose coumadin accordingly   cardio f/u  stress test in 4-6 wks  cont current meds  pt stable for d/c

## 2017-11-14 NOTE — PROGRESS NOTE ADULT - ASSESSMENT
85 [facility], A fib (on Xarelto was brought to Ed by family for slurred speech.  1.MRI-+ CVA, probable xarelto failure,  coumadin INR 2-3.  2.Afib-lopressor.  3.Statin.  4..PPI.  5.Stress test in 4-6 weeks.

## 2018-06-22 NOTE — PROGRESS NOTE ADULT - PROVIDER SPECIALTY LIST ADULT
Internal Medicine Learning About Diabetes Food Guidelines  Your Care Instructions    Meal planning is important to manage diabetes. It helps keep your blood sugar at a target level (which you set with your doctor). You don't have to eat special foods. You can eat what your family eats, including sweets once in a while. But you do have to pay attention to how often you eat and how much you eat of certain foods. You may want to work with a dietitian or a certified diabetes educator (CDE) to help you plan meals and snacks. A dietitian or CDE can also help you lose weight if that is one of your goals. What should you know about eating carbs? Managing the amount of carbohydrate (carbs) you eat is an important part of healthy meals when you have diabetes. Carbohydrate is found in many foods. · Learn which foods have carbs. And learn the amounts of carbs in different foods. ¨ Bread, cereal, pasta, and rice have about 15 grams of carbs in a serving. A serving is 1 slice of bread (1 ounce), ½ cup of cooked cereal, or 1/3 cup of cooked pasta or rice. ¨ Fruits have 15 grams of carbs in a serving. A serving is 1 small fresh fruit, such as an apple or orange; ½ of a banana; ½ cup of cooked or canned fruit; ½ cup of fruit juice; 1 cup of melon or raspberries; or 2 tablespoons of dried fruit. ¨ Milk and no-sugar-added yogurt have 15 grams of carbs in a serving. A serving is 1 cup of milk or 2/3 cup of no-sugar-added yogurt. ¨ Starchy vegetables have 15 grams of carbs in a serving. A serving is ½ cup of mashed potatoes or sweet potato; 1 cup winter squash; ½ of a small baked potato; ½ cup of cooked beans; or ½ cup cooked corn or green peas. · Learn how much carbs to eat each day and at each meal. A dietitian or CDE can teach you how to keep track of the amount of carbs you eat. This is called carbohydrate counting. · If you are not sure how to count carbohydrate grams, use the Plate Method to plan meals.  It is a good, quick way to make sure that you have a balanced meal. It also helps you spread carbs throughout the day. ¨ Divide your plate by types of foods. Put non-starchy vegetables on half the plate, meat or other protein food on one-quarter of the plate, and a grain or starchy vegetable in the final quarter of the plate. To this you can add a small piece of fruit and 1 cup of milk or yogurt, depending on how many carbs you are supposed to eat at a meal.  · Try to eat about the same amount of carbs at each meal. Do not \"save up\" your daily allowance of carbs to eat at one meal.  · Proteins have very little or no carbs per serving. Examples of proteins are beef, chicken, turkey, fish, eggs, tofu, cheese, cottage cheese, and peanut butter. A serving size of meat is 3 ounces, which is about the size of a deck of cards. Examples of meat substitute serving sizes (equal to 1 ounce of meat) are 1/4 cup of cottage cheese, 1 egg, 1 tablespoon of peanut butter, and ½ cup of tofu. How can you eat out and still eat healthy? · Learn to estimate the serving sizes of foods that have carbohydrate. If you measure food at home, it will be easier to estimate the amount in a serving of restaurant food. · If the meal you order has too much carbohydrate (such as potatoes, corn, or baked beans), ask to have a low-carbohydrate food instead. Ask for a salad or green vegetables. · If you use insulin, check your blood sugar before and after eating out to help you plan how much to eat in the future. · If you eat more carbohydrate at a meal than you had planned, take a walk or do other exercise. This will help lower your blood sugar. What else should you know? · Limit saturated fat, such as the fat from meat and dairy products. This is a healthy choice because people who have diabetes are at higher risk of heart disease. So choose lean cuts of meat and nonfat or low-fat dairy products.  Use olive or canola oil instead of butter or shortening when cooking. · Don't skip meals. Your blood sugar may drop too low if you skip meals and take insulin or certain medicines for diabetes. · Check with your doctor before you drink alcohol. Alcohol can cause your blood sugar to drop too low. Alcohol can also cause a bad reaction if you take certain diabetes medicines. Follow-up care is a key part of your treatment and safety. Be sure to make and go to all appointments, and call your doctor if you are having problems. It's also a good idea to know your test results and keep a list of the medicines you take. Where can you learn more? Go to http://margareth-ginette.info/. Enter Q780 in the search box to learn more about \"Learning About Diabetes Food Guidelines. \"  Current as of: March 13, 2017  Content Version: 11.4  © 4740-5457 [employer]. Care instructions adapted under license by [employer] (which disclaims liability or warranty for this information). If you have questions about a medical condition or this instruction, always ask your healthcare professional. [address] any warranty or liability for your use of this information.

## 2018-08-27 NOTE — PROVIDER CONTACT NOTE (OTHER) - RECOMMENDATIONS
awaiting reply from doctor regarding rate change, not following heparin nomogram, doing it manually only
n/a
negative...

## 2020-02-09 ENCOUNTER — TRANSCRIPTION ENCOUNTER (OUTPATIENT)
Age: 85
End: 2020-02-09

## 2020-02-10 ENCOUNTER — INPATIENT (INPATIENT)
Facility: HOSPITAL | Age: 85
LOS: 1 days | Discharge: HOME | DRG: 86 | End: 2020-02-12
Attending: NEUROLOGICAL SURGERY | Admitting: NEUROLOGICAL SURGERY
Payer: MEDICAID

## 2020-02-10 ENCOUNTER — EMERGENCY (EMERGENCY)
Facility: HOSPITAL | Age: 85
LOS: 1 days | Discharge: ANOTHER HOSPITAL | End: 2020-02-10
Attending: EMERGENCY MEDICINE
Payer: MEDICAID

## 2020-02-10 VITALS
RESPIRATION RATE: 20 BRPM | OXYGEN SATURATION: 100 % | HEART RATE: 102 BPM | DIASTOLIC BLOOD PRESSURE: 108 MMHG | SYSTOLIC BLOOD PRESSURE: 186 MMHG | WEIGHT: 100.09 LBS | TEMPERATURE: 98 F | HEIGHT: 59 IN

## 2020-02-10 VITALS — DIASTOLIC BLOOD PRESSURE: 72 MMHG | SYSTOLIC BLOOD PRESSURE: 148 MMHG | HEART RATE: 92 BPM

## 2020-02-10 VITALS
HEIGHT: 59 IN | WEIGHT: 139.99 LBS | HEART RATE: 118 BPM | TEMPERATURE: 98 F | RESPIRATION RATE: 16 BRPM | OXYGEN SATURATION: 97 % | DIASTOLIC BLOOD PRESSURE: 80 MMHG | SYSTOLIC BLOOD PRESSURE: 160 MMHG

## 2020-02-10 PROBLEM — I10 ESSENTIAL (PRIMARY) HYPERTENSION: Chronic | Status: ACTIVE | Noted: 2017-11-02

## 2020-02-10 LAB
ALBUMIN SERPL ELPH-MCNC: 3.6 G/DL — SIGNIFICANT CHANGE UP (ref 3.3–5)
ALP SERPL-CCNC: 83 U/L — SIGNIFICANT CHANGE UP (ref 40–120)
ALT FLD-CCNC: 36 U/L — SIGNIFICANT CHANGE UP (ref 10–45)
ANION GAP SERPL CALC-SCNC: 15 MMOL/L — SIGNIFICANT CHANGE UP (ref 5–17)
APPEARANCE UR: CLEAR — SIGNIFICANT CHANGE UP
APTT BLD: 35 SEC — SIGNIFICANT CHANGE UP (ref 27.5–36.3)
AST SERPL-CCNC: 62 U/L — HIGH (ref 10–40)
BACTERIA # UR AUTO: ABNORMAL
BASOPHILS # BLD AUTO: 0.06 K/UL — SIGNIFICANT CHANGE UP (ref 0–0.2)
BASOPHILS NFR BLD AUTO: 0.6 % — SIGNIFICANT CHANGE UP (ref 0–2)
BILIRUB SERPL-MCNC: 0.6 MG/DL — SIGNIFICANT CHANGE UP (ref 0.2–1.2)
BILIRUB UR-MCNC: NEGATIVE — SIGNIFICANT CHANGE UP
BUN SERPL-MCNC: 29 MG/DL — HIGH (ref 7–23)
CALCIUM SERPL-MCNC: 9.4 MG/DL — SIGNIFICANT CHANGE UP (ref 8.4–10.5)
CHLORIDE SERPL-SCNC: 100 MMOL/L — SIGNIFICANT CHANGE UP (ref 96–108)
CO2 SERPL-SCNC: 22 MMOL/L — SIGNIFICANT CHANGE UP (ref 22–31)
COLOR SPEC: COLORLESS — SIGNIFICANT CHANGE UP
CREAT SERPL-MCNC: 0.97 MG/DL — SIGNIFICANT CHANGE UP (ref 0.5–1.3)
DIFF PNL FLD: NEGATIVE — SIGNIFICANT CHANGE UP
EOSINOPHIL # BLD AUTO: 0.05 K/UL — SIGNIFICANT CHANGE UP (ref 0–0.5)
EOSINOPHIL NFR BLD AUTO: 0.5 % — SIGNIFICANT CHANGE UP (ref 0–6)
EPI CELLS # UR: 0 /HPF — SIGNIFICANT CHANGE UP
GLUCOSE SERPL-MCNC: 115 MG/DL — HIGH (ref 70–99)
GLUCOSE UR QL: NEGATIVE — SIGNIFICANT CHANGE UP
HCT VFR BLD CALC: 34.7 % — SIGNIFICANT CHANGE UP (ref 34.5–45)
HCT VFR BLD CALC: 35.2 % — SIGNIFICANT CHANGE UP (ref 34.5–45)
HGB BLD-MCNC: 11 G/DL — LOW (ref 11.5–15.5)
HGB BLD-MCNC: 11.1 G/DL — LOW (ref 11.5–15.5)
HYALINE CASTS # UR AUTO: 0 /LPF — SIGNIFICANT CHANGE UP (ref 0–2)
IMM GRANULOCYTES NFR BLD AUTO: 0.4 % — SIGNIFICANT CHANGE UP (ref 0–1.5)
INR BLD: 1.1 RATIO — SIGNIFICANT CHANGE UP (ref 0.88–1.16)
KETONES UR-MCNC: NEGATIVE — SIGNIFICANT CHANGE UP
LEUKOCYTE ESTERASE UR-ACNC: NEGATIVE — SIGNIFICANT CHANGE UP
LYMPHOCYTES # BLD AUTO: 1.96 K/UL — SIGNIFICANT CHANGE UP (ref 1–3.3)
LYMPHOCYTES # BLD AUTO: 18.9 % — SIGNIFICANT CHANGE UP (ref 13–44)
MCHC RBC-ENTMCNC: 29.5 PG — SIGNIFICANT CHANGE UP (ref 27–34)
MCHC RBC-ENTMCNC: 30.2 PG — SIGNIFICANT CHANGE UP (ref 27–34)
MCHC RBC-ENTMCNC: 31.3 GM/DL — LOW (ref 32–36)
MCHC RBC-ENTMCNC: 32 GM/DL — SIGNIFICANT CHANGE UP (ref 32–36)
MCV RBC AUTO: 94.3 FL — SIGNIFICANT CHANGE UP (ref 80–100)
MCV RBC AUTO: 94.4 FL — SIGNIFICANT CHANGE UP (ref 80–100)
MONOCYTES # BLD AUTO: 1.11 K/UL — HIGH (ref 0–0.9)
MONOCYTES NFR BLD AUTO: 10.7 % — SIGNIFICANT CHANGE UP (ref 2–14)
NEUTROPHILS # BLD AUTO: 7.15 K/UL — SIGNIFICANT CHANGE UP (ref 1.8–7.4)
NEUTROPHILS NFR BLD AUTO: 68.9 % — SIGNIFICANT CHANGE UP (ref 43–77)
NITRITE UR-MCNC: NEGATIVE — SIGNIFICANT CHANGE UP
NRBC # BLD: 0 /100 WBCS — SIGNIFICANT CHANGE UP (ref 0–0)
NRBC # BLD: 0 /100 WBCS — SIGNIFICANT CHANGE UP (ref 0–0)
PH UR: 7 — SIGNIFICANT CHANGE UP (ref 5–8)
PLATELET # BLD AUTO: 176 K/UL — SIGNIFICANT CHANGE UP (ref 150–400)
PLATELET # BLD AUTO: 210 K/UL — SIGNIFICANT CHANGE UP (ref 150–400)
POTASSIUM SERPL-MCNC: 4.3 MMOL/L — SIGNIFICANT CHANGE UP (ref 3.5–5.3)
POTASSIUM SERPL-SCNC: 4.3 MMOL/L — SIGNIFICANT CHANGE UP (ref 3.5–5.3)
PROT SERPL-MCNC: 6.6 G/DL — SIGNIFICANT CHANGE UP (ref 6–8.3)
PROT UR-MCNC: NEGATIVE — SIGNIFICANT CHANGE UP
PROTHROM AB SERPL-ACNC: 12.2 SEC — SIGNIFICANT CHANGE UP (ref 10–12.9)
RBC # BLD: 3.68 M/UL — LOW (ref 3.8–5.2)
RBC # BLD: 3.73 M/UL — LOW (ref 3.8–5.2)
RBC # FLD: 12.8 % — SIGNIFICANT CHANGE UP (ref 10.3–14.5)
RBC # FLD: 12.8 % — SIGNIFICANT CHANGE UP (ref 10.3–14.5)
RBC CASTS # UR COMP ASSIST: 1 /HPF — SIGNIFICANT CHANGE UP (ref 0–4)
SODIUM SERPL-SCNC: 137 MMOL/L — SIGNIFICANT CHANGE UP (ref 135–145)
SP GR SPEC: 1.01 — LOW (ref 1.01–1.02)
UROBILINOGEN FLD QL: NEGATIVE — SIGNIFICANT CHANGE UP
WBC # BLD: 10.37 K/UL — SIGNIFICANT CHANGE UP (ref 3.8–10.5)
WBC # BLD: 5.95 K/UL — SIGNIFICANT CHANGE UP (ref 3.8–10.5)
WBC # FLD AUTO: 10.37 K/UL — SIGNIFICANT CHANGE UP (ref 3.8–10.5)
WBC # FLD AUTO: 5.95 K/UL — SIGNIFICANT CHANGE UP (ref 3.8–10.5)
WBC UR QL: 2 /HPF — SIGNIFICANT CHANGE UP (ref 0–5)

## 2020-02-10 PROCEDURE — 85610 PROTHROMBIN TIME: CPT

## 2020-02-10 PROCEDURE — 72125 CT NECK SPINE W/O DYE: CPT | Mod: 26

## 2020-02-10 PROCEDURE — 99291 CRITICAL CARE FIRST HOUR: CPT | Mod: 25

## 2020-02-10 PROCEDURE — 70450 CT HEAD/BRAIN W/O DYE: CPT | Mod: 26

## 2020-02-10 PROCEDURE — 99285 EMERGENCY DEPT VISIT HI MDM: CPT

## 2020-02-10 PROCEDURE — 85730 THROMBOPLASTIN TIME PARTIAL: CPT

## 2020-02-10 PROCEDURE — 96365 THER/PROPH/DIAG IV INF INIT: CPT | Mod: XU

## 2020-02-10 PROCEDURE — 72125 CT NECK SPINE W/O DYE: CPT

## 2020-02-10 PROCEDURE — 99291 CRITICAL CARE FIRST HOUR: CPT

## 2020-02-10 PROCEDURE — 12013 RPR F/E/E/N/L/M 2.6-5.0 CM: CPT

## 2020-02-10 PROCEDURE — 85027 COMPLETE CBC AUTOMATED: CPT

## 2020-02-10 PROCEDURE — 70486 CT MAXILLOFACIAL W/O DYE: CPT | Mod: 26

## 2020-02-10 PROCEDURE — 36415 COLL VENOUS BLD VENIPUNCTURE: CPT

## 2020-02-10 PROCEDURE — 70486 CT MAXILLOFACIAL W/O DYE: CPT

## 2020-02-10 PROCEDURE — 70450 CT HEAD/BRAIN W/O DYE: CPT

## 2020-02-10 RX ORDER — AMLODIPINE BESYLATE 2.5 MG/1
1 TABLET ORAL
Qty: 0 | Refills: 0 | DISCHARGE

## 2020-02-10 RX ORDER — NICARDIPINE HYDROCHLORIDE 30 MG/1
5 CAPSULE, EXTENDED RELEASE ORAL
Qty: 40 | Refills: 0 | Status: DISCONTINUED | OUTPATIENT
Start: 2020-02-10 | End: 2020-02-17

## 2020-02-10 RX ORDER — FENOFIBRATE,MICRONIZED 130 MG
1 CAPSULE ORAL
Qty: 0 | Refills: 0 | DISCHARGE

## 2020-02-10 RX ORDER — PROTHROMBIN COMPLEX CONCENTRATE (HUMAN) 25.5; 16.5; 24; 22; 22; 26 [IU]/ML; [IU]/ML; [IU]/ML; [IU]/ML; [IU]/ML; [IU]/ML
1500 POWDER, FOR SOLUTION INTRAVENOUS ONCE
Refills: 0 | Status: COMPLETED | OUTPATIENT
Start: 2020-02-10 | End: 2020-02-10

## 2020-02-10 RX ORDER — NICARDIPINE HYDROCHLORIDE 30 MG/1
5 CAPSULE, EXTENDED RELEASE ORAL
Qty: 40 | Refills: 0 | Status: DISCONTINUED | OUTPATIENT
Start: 2020-02-10 | End: 2020-02-11

## 2020-02-10 RX ORDER — METOPROLOL TARTRATE 50 MG
1 TABLET ORAL
Qty: 0 | Refills: 0 | DISCHARGE

## 2020-02-10 RX ORDER — LOSARTAN POTASSIUM 100 MG/1
1 TABLET, FILM COATED ORAL
Qty: 0 | Refills: 0 | DISCHARGE

## 2020-02-10 RX ADMIN — NICARDIPINE HYDROCHLORIDE 25 MG/HR: 30 CAPSULE, EXTENDED RELEASE ORAL at 22:49

## 2020-02-10 RX ADMIN — NICARDIPINE HYDROCHLORIDE 25 MG/HR: 30 CAPSULE, EXTENDED RELEASE ORAL at 20:34

## 2020-02-10 RX ADMIN — PROTHROMBIN COMPLEX CONCENTRATE (HUMAN) 400 INTERNATIONAL UNIT(S): 25.5; 16.5; 24; 22; 22; 26 POWDER, FOR SOLUTION INTRAVENOUS at 20:15

## 2020-02-10 NOTE — ED PROVIDER NOTE - OBJECTIVE STATEMENT
87F from home, lives with daughter-in-law, w/ PMHx Afib (on Eliquis), HTN, CVA (2017) who was transferred from [facility] for trace SAH. presented to the [facility] ED after having a mechanical fall at home while hanging clothes. Patient reports that when she stepped on the bed to hang clothes, she lost balance and fell to the floor, hitting R side of the face with her eyeglasses on. Patient subsequently started bleeding which prompted family to bring her to the ED for further evaluation. Denies dizziness, chest pain, palpitations or any symptoms prior or during the event 87F from home, lives with daughter-in-law, w/ PMHx Afib (on Eliquis), HTN, CVA (2017) who was transferred from [facility] for trace SAH. presented to the [facility] ED after having a mechanical fall at home while hanging clothes. Patient reports that when she stepped on the bed to hang clothes, she lost balance and fell to the floor, hitting R side of the face with her eyeglasses on. Patient subsequently started bleeding which prompted family to bring her to the ED for further evaluation. Denies dizziness, chest pain, palpitations or any symptoms prior or during the event.  Pt also took Aspirin 81mg yesterday morning

## 2020-02-10 NOTE — ED PROVIDER NOTE - CLINICAL SUMMARY MEDICAL DECISION MAKING FREE TEXT BOX
87F w/ PMHx Afib (on Eliquis) p/w R facial laceration s/p mechanical fall at home. Will order CT brain 6 hour repeat as per neurosx, rpt cbc, Pt already received K centra and Nicardipine and Sys at 160. Check UA. 87F w/ PMHx Afib (on Eliquis) p/w R facial laceration s/p mechanical fall at home. Pt also took her morning Aspirin 81mg. Will continue her Nicardipine drip with goal to keep sys <160. Will order CT brain 6 hour repeat as per neurosx, rpt cbc, Pt already received K centra and Nicardipine and Sys at 160. Check UA.

## 2020-02-10 NOTE — ED PROVIDER NOTE - CLINICAL SUMMARY MEDICAL DECISION MAKING FREE TEXT BOX
87F w/ PMHx Afib (on Eliquis) p/w R facial laceration s/p mechanical fall at home. Will order CT brain/ maxillofacial/ cervical spine to r/o any acute pathology.

## 2020-02-10 NOTE — ED ADULT NURSE NOTE - OBJECTIVE STATEMENT
87 YOF A&OX3 tagalog speaking only (family at bedside for translation) with pmh of a-fib, HTN, CVA (no deficits) brought in by EMS as transfer from [place] s/p fall on eliquis. EMS states pt had a fall today on blood thinner eliquis, no LOC, and was found to have a parietal subarachnoid hemorrhage. pt started on nicardipine drip at  due to pt's BP being in 190s at 25 mL/hr. Pt also given kcentra at . Pt's current BP in 140s systolically. pt has no complaints at this time but noted to have right eye ecchymosis, pt unable to fully open right eye. Neuro check flowsheet started in [facility] ED, pt neurologically intact (see flowsheet). pt denies sob, chest pain, n/v/d, headaches, dizziness, blurry vision. safety maintained, family at bedside.

## 2020-02-10 NOTE — ED PROVIDER NOTE - PHYSICAL EXAMINATION
GEN: Well appearing, well nourished, in no apparent distress.  HEAD: R periorbital edema and ecchymosis with laceration  HEENT: PERRL, EOMI, no nystagmus, no facial droop, Airway patent, uvula midline, MMM, neck supple, no LAD, no JVD, no raccoon sign, no rod sign   LUNG: CTAB, no adventitious sounds, no retractions, no nasal flaring  CV: irregularly irregular, no murmurs,   Abd: soft, NTND, no rebound or guarding, BS+ in all quadrants, no CVAT  MSK: WWP, Pulses 2+ in extremities, No edema, no visible deformities, strength 5/5 in all extremities, FROM, no midspine TTP  Neuro:  CN II-XII in tact. AAOx3,  sensations in tact in all extremities, neg pronator drift, neg finger to nose  Skin: Warm and dry, no evidence of rash  Psych: normal mood and affect

## 2020-02-10 NOTE — ED ADULT NURSE NOTE - NSIMPLEMENTINTERV_GEN_ALL_ED
Implemented All Fall with Harm Risk Interventions:  Leonard to call system. Call bell, personal items and telephone within reach. Instruct patient to call for assistance. Room bathroom lighting operational. Non-slip footwear when patient is off stretcher. Physically safe environment: no spills, clutter or unnecessary equipment. Stretcher in lowest position, wheels locked, appropriate side rails in place. Provide visual cue, wrist band, yellow gown, etc. Monitor gait and stability. Monitor for mental status changes and reorient to person, place, and time. Review medications for side effects contributing to fall risk. Reinforce activity limits and safety measures with patient and family. Provide visual clues: red socks.

## 2020-02-10 NOTE — ED PROVIDER NOTE - PROGRESS NOTE DETAILS
Endorsed to Dr Chi Booth MD, Facep Dr Hillman: Called neurosurgery to update them that pt took her morning baby aspirin yesterday morning. They recommended ddavp, pharmacy called and recommends 0.4mcg/kg IVPB

## 2020-02-10 NOTE — ED PROVIDER NOTE - OBJECTIVE STATEMENT
87F from home, lives with daughter-in-law, w/ PMHx Afib (on Eliquis), HTN, CVA (2017) who presented to the ED after having a mechanical fall at home while hanging clothes. Patient reports she stepped on the bed to hand the clothes, however, she lost balance and fell to the floor, hitting R side of the face with her eyeglasses on. Patient subsequently started bleeding which prompted family to bring her to the ED for further evaluation. Denies dizziness, chest pain, palpitations or any symptoms prior or during the event.

## 2020-02-10 NOTE — ED ADULT TRIAGE NOTE - TEMPERATURE IN FAHRENHEIT (DEGREES F)
[facility]  Psychology  Consult Note    Health and Behavior Assessment - CPT 40139    Patient Name: Hema Kuo  MRN: [id number]   Patient Class: IP- Inpatient  Admission Date: 10/30/2017  Hospital Length of Stay: 2 days  Attending Physician: Lonnie Bucio MD  Primary Care Provider: Ann Reyna NP    Inpatient consult to Hematology/Oncology Psychology  Consult performed by: QUIN XIAO  Consult ordered by: LONNIE BUCIO  Reason for consult: recent AML diagnosis  Assessment/Recommendations: Appropriate emotional and behavioral response to diagnosis/illness.  Committed to maintenance of smoking cessation            History of Present Illness:   18 year old patient with very recent diagnosis of AML    Pain: 0    Symptoms:   · Mood: denied; no history of depression, no history of abdirashid  · Anxiety: denied  · Substance Abuse: denied  · Cognitive Functioning: denied  · Health Behaviors: recent smoking, stopped upon admission, some withdrawal symptoms (irritability, low frustration tolerance, mental cloudiness) and fatigue (possibly illness related); 100% confident in remaining quit   Appropriate emotional response to illness, focusing on jerod and family support     Psychiatric History: psychotropic management by PCP and has participated in counseling/psychotherapy on an outpatient basis in the past    Family History of Psychiatric Illness: father with bipolar disorder    Social History (marriage, employment, etc.): Never , no children, lives with his mother, strong family/friend support, 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 2 sodas/day    Current Medications and Drug Reactions (include OTC, herbal):   See medication list.    Psychotherapeutics     None          Strengths and Liabilities: Strength: Patient  accepts guidance/feedback, Strength: Patient has positive support network., Strength: Patient has reasonable judgment., Strength: Patient is stable., Liability: Patient has poor health.    Current Evaluation:     Mental Status Exam:  General Appearance:  unremarkable, age appropriate   Speech: normal tone, normal rate, normal pitch, normal volume      Level of Cooperation: cooperative      Thought Processes: normal and logical   Mood: euthymic      Thought Content: normal, no suicidality, no homicidality, delusions, or paranoia   Affect: congruent and appropriate   Orientation: Oriented x3                             Diagnostic Impression - Plan:     * Acute leukemia    Appropriate mood and coping with recent diagnosis.  Patient and family given information about psychotherapeutic resources if needed in the future.            Length of Service (minutes): 30    Dallin Wilcox, PhD  Psychology  [facility]   98.1

## 2020-02-10 NOTE — ED ADULT NURSE NOTE - NSIMPLEMENTINTERV_GEN_ALL_ED
Implemented All Universal Safety Interventions:  Lynch to call system. Call bell, personal items and telephone within reach. Instruct patient to call for assistance. Room bathroom lighting operational. Non-slip footwear when patient is off stretcher. Physically safe environment: no spills, clutter or unnecessary equipment. Stretcher in lowest position, wheels locked, appropriate side rails in place.

## 2020-02-10 NOTE — ED ADULT NURSE NOTE - CHPI ED NUR SYMPTOMS NEG
no dizziness/no change in level of consciousness/no confusion/no weakness/no nausea/no loss of consciousness/no fever/no numbness/no vomiting

## 2020-02-10 NOTE — ED PROVIDER NOTE - ATTENDING CONTRIBUTION TO CARE
Private Physician Bernabe Masters PCP/  87y female pmh Afib on Elquis,CVA, HTN, pt comes to ed transfer from , Pt had mechanical fall Struck rt face ct + for SAH, given  K-centra and transferred for NSX eval. PT denies pain other than face/head, No neck pain, cp,sob, cough, PE WDWN awake alert Large rt facial hematoma, Awake alert neck supple chest clear anterior & posterior abd soft +bs no mass guarding, neruo gcs 15 speech fleunt, power 5/5. CV occasional irregualr beat.  Oliver Booth MD, Facep

## 2020-02-10 NOTE — ED PROVIDER NOTE - CARDIOVASCULAR NEGATIVE STATEMENT, MLM
HISTORY OF PRESENT ILLNESS  We had the pleasure of seeing Rachell Espinoza today, 11/16/2017, in followup of her acute on chronic kidney disease. She was just discharged from [facility] on November 10, 2017 following an admission for adenocarcinoma of the gallbladder status post percutaneous drainage of abdominal abscess, located by acute on chronic kidney injury as well as hyponatremia. Since discharge she's been feeling better. She is currently taking furosemide 20 mg p.o. q. day. Her ankle and feet swelling have improved.    PAST MEDICAL HISTORY  Past Medical History:   Diagnosis Date   • Choledocholithiasis    • Chronic pain     low back   • Common bile duct (CBD) stricture    • Constipation    • DDD (degenerative disc disease), lumbar    • Diverticulosis    • Gallbladder cancer, carcinoma (CMS/HCC) 09/08/2017    poorly differentiated adenocarcinoma   • Glaucoma    • IGT (impaired glucose tolerance)    • Lung nodules    • OA (osteoarthritis)    • Obesity, unspecified    • Osteoarthritis    • Other and unspecified hyperlipidemia    • Unspecified essential hypertension    • Uses walker     4 wheel   • Wears dentures     upper denture       SOCIAL HISTORY  Social History   Substance Use Topics   • Smoking status: Former Smoker     Packs/day: 2.00     Years: 5.00     Types: Cigarettes     Start date: 10/27/1968     Quit date: 10/27/1973   • Smokeless tobacco: Never Used      Comment: smoking history is approx. per patient report   • Alcohol use No       FAMILY HISTORY  Family History   Problem Relation Age of Onset   • Cancer Mother      BREAST   • Depression Mother    • Cancer Father      BLADDER     LIVER   LUNG   KIDNEY   • Hypertension Daughter    • Obesity Daughter    • Obesity Son    • Diabetes Son        MEDICATIONS  Current Outpatient Prescriptions   Medication Sig   • gabapentin (NEURONTIN) 100 MG capsule Take 1 capsule by mouth 3 times daily.   • furosemide (LASIX) 20 MG tablet Take 1 tablet by mouth daily.    • sertraline (ZOLOFT) 25 MG tablet Take 1 tablet by mouth daily.   • Sodium Chloride Flush (NORMAL SALINE FLUSH) 0.9 % injectable solution Flush with 5 mLs once daily. Syringes are for single use only--discard remainder.   • HYDROcodone-acetaminophen (NORCO) 5-325 MG per tablet Take 1 tablet by mouth every 8 hours as needed for Pain.   • ciprofloxacin (CIPRO) 500 MG tablet Take 1 tablet by mouth every 12 hours for 14 days.   • amoxicillin-clavulanate (AUGMENTIN) 500-125 MG per tablet Take 1 tablet by mouth every 12 hours for 14 days.   • ferrous sulfate 325 (65 FE) MG tablet Take 1 tablet by mouth 2 times daily (with meals).   • ALBUTEROL SULFATE IN Inhale into the lungs as needed. States ordered but not used or needed at this time   • potassium chloride (K-DUR,KLOR-CON) 10 MEQ CR tablet Take 2 tablets by mouth daily.   • Ascorbic Acid (VITAMIN C ADULT GUMMIES PO) Take 2 tablets by mouth daily.   • docusate sodium-sennosides (SENOKOT S) 50-8.6 MG per tablet Take 1 tablet by mouth nightly.   • Omega-3 Fatty Acids (FISH OIL) 1000 MG capsule Take by mouth daily.   • losartan (COZAAR) 50 MG tablet Take 1 tablet by mouth daily.   • atorvastatin (LIPITOR) 20 MG tablet Take 1 tablet by mouth daily.   • nystatin (MYCOSTATIN) 180951 UNIT/GM powder Bid if needed   • Calcium-Vitamin D 600-200 MG-UNIT TABS Take 1 tablet by mouth daily.   • Multiple Vitamin capsule Take 1 capsule by mouth daily.   • timolol (TIMOPTIC XE) 0.25 % ophthalmic gel-forming Place 1 drop into both eyes nightly.      No current facility-administered medications for this visit.        ALLERGIES  Allergies as of 11/16/2017   • (No Known Allergies)       REVIEW OF SYSTEMS  Please see history of present illness; otherwise rest of review of systems is negative.     PHYSICAL EXAMINATION  Vitals 11/10/2017 11/12/2017 11/13/2017 11/14/2017 11/14/2017 11/14/2017 11/16/2017   SYSTOLIC 128 122 116 136 92 100 118   DIASTOLIC 60 64 78 66 66 60 60   Pulse 80 83 80  80 - - 80   Temp 98.3 97.8 96.7 97.6 - - -   Resp 18 18 20 18 - - 16   Weight kg 118.389 kg 118.389 kg 116.574 kg - 117.482 kg - 116.302 kg   Height - 5' 6\" - - - - 5' 6\"   BMI (Calculated) 42.21 42.21 - - - - 41.47   Some recent data might be hidden   ]    HEENT: Normocephalic.  Neck: No JVD.  Chest: Clear without crackles.   Heart: S1, S2.  No S3 or rub.   Abdomen: Soft, nontender.  Extremities: She has 1+ pedal edema on the right lower extremity with trace pedal edema on the left lower extremity.  Skin: No rash.   Neurologic: Normal motor nonfocal.    LABORATORY DATA  Sodium (mmol/L)   Date Value   11/14/2017 136   11/10/2017 140   11/09/2017 138     Potassium (mmol/L)   Date Value   11/14/2017 4.2   11/10/2017 3.6   11/09/2017 3.6     Chloride (mmol/L)   Date Value   11/14/2017 99   11/10/2017 103   11/09/2017 100     Carbon Dioxide (mmol/L)   Date Value   11/14/2017 25   11/10/2017 27   11/09/2017 31     Anion Gap (mmol/L)   Date Value   11/14/2017 16   11/10/2017 14   11/09/2017 11     BUN (mg/dL)   Date Value   11/14/2017 15   11/10/2017 16   11/09/2017 18     Creatinine (mg/dL)   Date Value   11/14/2017 1.19 (H)   11/10/2017 1.48 (H)   11/09/2017 1.65 (H)     GFR Estimate, Non  (no units)   Date Value   11/14/2017 45   11/10/2017 35   11/09/2017 30     GFR Estimate,  (no units)   Date Value   11/14/2017 52   11/10/2017 40   11/09/2017 35     Glucose (mg/dL)   Date Value   11/14/2017 101 (H)   11/10/2017 93   11/09/2017 94     CALCIUM (mg/dL)   Date Value   11/14/2017 9.0   11/10/2017 8.4   11/09/2017 8.4     Albumin (g/dL)   Date Value   11/10/2017 2.6 (L)   11/09/2017 2.1 (L)   11/08/2017 2.1 (L)     PHOSPHORUS (mg/dL)   Date Value   11/10/2017 3.6   11/09/2017 5.1 (H)   11/08/2017 4.0     MAGNESIUM (mg/dL)   Date Value   11/10/2017 2.1   11/09/2017 2.1   11/08/2017 1.8     AST/SGOT (Units/L)   Date Value   11/10/2017 23   11/09/2017 22   11/08/2017 22     ALT/SGPT (Units/L)    Date Value   11/10/2017 32   11/09/2017 32   11/08/2017 32     ALK PHOSPHATASE (Units/L)   Date Value   11/10/2017 68   11/09/2017 74   11/08/2017 79     TOTAL BILIRUBIN (mg/dL)   Date Value   11/10/2017 0.3   11/09/2017 0.3   11/08/2017 0.2     Lipase (Units/L)   Date Value   09/06/2017 128       WBC (K/mcL)   Date Value   11/10/2017 10.8   11/09/2017 11.2 (H)   11/08/2017 11.4 (H)     No results found for: UCR      IMPRESSION/PLAN  1. Acute renal failure, unspecified acute renal failure type (CMS/HCC)    2. Chronic kidney disease (CKD), stage III (moderate)    3. Generalized edema    4. Hypertensive kidney disease    5. Hyponatremia        Orders Placed This Encounter   • Basic Metabolic Panel [23075]       Return in 3 months (on 2/16/2018).    Ms. Espinoza has acute kidney injury on stage III chronic kidney disease. Baseline creatinine is thought to be about 1.1. Serum creatinine has improved from 1.48 on November 10, 2017 to it's more current 1.19 here on November 14, 2017. Her sodium has improved to 136 as of November 14, 2017. She continues to appear to be tolerating her current diuretics well from a nephrology standpoint. Her weight is down from day of discharge. We would not recommend any additional changes in her medications today. We will plan to see her back again in approximately 3 months, we would like her to get the above labs done about a week prior to coming to see us with those results will forwarded to us here. She should be following a 2 g per day oral sodium restriction. We have reviewed with her the potential nephrotoxicity of nonsteroidal anti-inflammatories and Antoine 2 blockers, and we do urge to try and avoid these as much as possible. She is a very pleasant lady. We are grateful for the continued opportunity to be able to participate in Ms. Espinoza care with you.  We very much look forward to working with you again in the future.        no chest pain and no edema.

## 2020-02-10 NOTE — ED PROVIDER NOTE - ATTENDING CONTRIBUTION TO CARE
I completed an independent physical examination. I have discussed the patient’s plan of care and disposition with the resident.    Patient with facial pain/swelling s/p fall. CTH, neck, face and wound repair per plastics.

## 2020-02-10 NOTE — ED PROVIDER NOTE - NS ED ROS FT
Constitutional: no fevers, no chills.  Eyes: Right periorbital pain, no pain, no redness  Ears: no ear drainage, no ear pain.  Nose: no nasal congestion.  Mouth/Throat: no sore throat.  Cardiovascular: no chest pain.  Respiratory: no shortness of breath, no wheezing, no cough  Gastrointestinal: no nausea, no vomiting, no diarrhea, no abdominal pain.  MSK: no flank pain, no back pain.  Genitourinary: no dysuria, no hematuria.  Skin: no rashes.  Neuro: no headache,   Psychiatric: no known mental health issues.

## 2020-02-10 NOTE — ED PROVIDER NOTE - CARE PLAN
Principal Discharge DX:	Subarachnoid hemorrhage following injury, no loss of consciousness, initial encounter

## 2020-02-10 NOTE — ED PROVIDER NOTE - PROGRESS NOTE DETAILS
Lac repaired by Dr. Quan (Plastics). CT concerning for SAH. Spoke with NSG at [facility] who recommends eliquis reversal and CT in 4-6 hours. Due to the high risk of worsening bleed will transfer for repeat imaging.

## 2020-02-11 ENCOUNTER — TRANSCRIPTION ENCOUNTER (OUTPATIENT)
Age: 85
End: 2020-02-11

## 2020-02-11 DIAGNOSIS — I63.9 CEREBRAL INFARCTION, UNSPECIFIED: ICD-10-CM
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
## 2020-02-11 DIAGNOSIS — I60.9 NONTRAUMATIC SUBARACHNOID HEMORRHAGE, UNSPECIFIED: ICD-10-CM

## 2020-02-11 DIAGNOSIS — Z29.9 ENCOUNTER FOR PROPHYLACTIC MEASURES, UNSPECIFIED: ICD-10-CM

## 2020-02-11 DIAGNOSIS — I48.20 CHRONIC ATRIAL FIBRILLATION, UNSPECIFIED: ICD-10-CM

## 2020-02-11 DIAGNOSIS — I10 ESSENTIAL (PRIMARY) HYPERTENSION: ICD-10-CM

## 2020-02-11 LAB
ALBUMIN SERPL ELPH-MCNC: 4 G/DL — SIGNIFICANT CHANGE UP (ref 3.3–5)
ALP SERPL-CCNC: 83 U/L — SIGNIFICANT CHANGE UP (ref 40–120)
ALT FLD-CCNC: 35 U/L — SIGNIFICANT CHANGE UP (ref 10–45)
ANION GAP SERPL CALC-SCNC: 19 MMOL/L — HIGH (ref 5–17)
AST SERPL-CCNC: 51 U/L — HIGH (ref 10–40)
BILIRUB SERPL-MCNC: 1 MG/DL — SIGNIFICANT CHANGE UP (ref 0.2–1.2)
BUN SERPL-MCNC: 23 MG/DL — SIGNIFICANT CHANGE UP (ref 7–23)
CALCIUM SERPL-MCNC: 9.7 MG/DL — SIGNIFICANT CHANGE UP (ref 8.4–10.5)
CHLORIDE SERPL-SCNC: 96 MMOL/L — SIGNIFICANT CHANGE UP (ref 96–108)
CO2 SERPL-SCNC: 23 MMOL/L — SIGNIFICANT CHANGE UP (ref 22–31)
CREAT SERPL-MCNC: 0.87 MG/DL — SIGNIFICANT CHANGE UP (ref 0.5–1.3)
GLUCOSE SERPL-MCNC: 149 MG/DL — HIGH (ref 70–99)
HCT VFR BLD CALC: 33.9 % — LOW (ref 34.5–45)
HGB BLD-MCNC: 10.9 G/DL — LOW (ref 11.5–15.5)
MCHC RBC-ENTMCNC: 29.8 PG — SIGNIFICANT CHANGE UP (ref 27–34)
MCHC RBC-ENTMCNC: 32.2 GM/DL — SIGNIFICANT CHANGE UP (ref 32–36)
MCV RBC AUTO: 92.6 FL — SIGNIFICANT CHANGE UP (ref 80–100)
NRBC # BLD: 0 /100 WBCS — SIGNIFICANT CHANGE UP (ref 0–0)
PLATELET # BLD AUTO: 193 K/UL — SIGNIFICANT CHANGE UP (ref 150–400)
POTASSIUM SERPL-MCNC: 3.8 MMOL/L — SIGNIFICANT CHANGE UP (ref 3.5–5.3)
POTASSIUM SERPL-SCNC: 3.8 MMOL/L — SIGNIFICANT CHANGE UP (ref 3.5–5.3)
PROT SERPL-MCNC: 6.8 G/DL — SIGNIFICANT CHANGE UP (ref 6–8.3)
RBC # BLD: 3.66 M/UL — LOW (ref 3.8–5.2)
RBC # FLD: 12.7 % — SIGNIFICANT CHANGE UP (ref 10.3–14.5)
SODIUM SERPL-SCNC: 138 MMOL/L — SIGNIFICANT CHANGE UP (ref 135–145)
WBC # BLD: 8.67 K/UL — SIGNIFICANT CHANGE UP (ref 3.8–10.5)
WBC # FLD AUTO: 8.67 K/UL — SIGNIFICANT CHANGE UP (ref 3.8–10.5)

## 2020-02-11 PROCEDURE — 70450 CT HEAD/BRAIN W/O DYE: CPT | Mod: 26,77

## 2020-02-11 PROCEDURE — 99221 1ST HOSP IP/OBS SF/LOW 40: CPT

## 2020-02-11 PROCEDURE — 99232 SBSQ HOSP IP/OBS MODERATE 35: CPT

## 2020-02-11 PROCEDURE — 99223 1ST HOSP IP/OBS HIGH 75: CPT

## 2020-02-11 PROCEDURE — 70450 CT HEAD/BRAIN W/O DYE: CPT | Mod: 26

## 2020-02-11 RX ORDER — LEVETIRACETAM 250 MG/1
500 TABLET, FILM COATED ORAL
Refills: 0 | Status: DISCONTINUED | OUTPATIENT
Start: 2020-02-11 | End: 2020-02-12

## 2020-02-11 RX ORDER — ACETAMINOPHEN 500 MG
650 TABLET ORAL EVERY 6 HOURS
Refills: 0 | Status: DISCONTINUED | OUTPATIENT
Start: 2020-02-11 | End: 2020-02-11

## 2020-02-11 RX ORDER — AMLODIPINE BESYLATE 2.5 MG/1
5 TABLET ORAL DAILY
Refills: 0 | Status: DISCONTINUED | OUTPATIENT
Start: 2020-02-11 | End: 2020-02-12

## 2020-02-11 RX ORDER — DESMOPRESSIN ACETATE 0.1 MG/1
25 TABLET ORAL ONCE
Refills: 0 | Status: COMPLETED | OUTPATIENT
Start: 2020-02-11 | End: 2020-02-11

## 2020-02-11 RX ORDER — SENNA PLUS 8.6 MG/1
2 TABLET ORAL AT BEDTIME
Refills: 0 | Status: DISCONTINUED | OUTPATIENT
Start: 2020-02-11 | End: 2020-02-12

## 2020-02-11 RX ORDER — FOLIC ACID 0.8 MG
1 TABLET ORAL DAILY
Refills: 0 | Status: DISCONTINUED | OUTPATIENT
Start: 2020-02-11 | End: 2020-02-12

## 2020-02-11 RX ORDER — ONDANSETRON 8 MG/1
4 TABLET, FILM COATED ORAL EVERY 6 HOURS
Refills: 0 | Status: DISCONTINUED | OUTPATIENT
Start: 2020-02-11 | End: 2020-02-12

## 2020-02-11 RX ORDER — FENOFIBRATE,MICRONIZED 130 MG
145 CAPSULE ORAL DAILY
Refills: 0 | Status: DISCONTINUED | OUTPATIENT
Start: 2020-02-11 | End: 2020-02-12

## 2020-02-11 RX ORDER — METOPROLOL TARTRATE 50 MG
100 TABLET ORAL DAILY
Refills: 0 | Status: DISCONTINUED | OUTPATIENT
Start: 2020-02-11 | End: 2020-02-12

## 2020-02-11 RX ORDER — AMLODIPINE BESYLATE 2.5 MG/1
10 TABLET ORAL DAILY
Refills: 0 | Status: DISCONTINUED | OUTPATIENT
Start: 2020-02-11 | End: 2020-02-11

## 2020-02-11 RX ORDER — LOSARTAN POTASSIUM 100 MG/1
100 TABLET, FILM COATED ORAL DAILY
Refills: 0 | Status: DISCONTINUED | OUTPATIENT
Start: 2020-02-11 | End: 2020-02-12

## 2020-02-11 RX ORDER — DESMOPRESSIN ACETATE 0.1 MG/1
25 TABLET ORAL ONCE
Refills: 0 | Status: DISCONTINUED | OUTPATIENT
Start: 2020-02-11 | End: 2020-02-11

## 2020-02-11 RX ADMIN — AMLODIPINE BESYLATE 10 MILLIGRAM(S): 2.5 TABLET ORAL at 05:25

## 2020-02-11 RX ADMIN — LEVETIRACETAM 500 MILLIGRAM(S): 250 TABLET, FILM COATED ORAL at 18:34

## 2020-02-11 RX ADMIN — LOSARTAN POTASSIUM 100 MILLIGRAM(S): 100 TABLET, FILM COATED ORAL at 05:25

## 2020-02-11 RX ADMIN — Medication 1 MILLIGRAM(S): at 12:53

## 2020-02-11 RX ADMIN — DESMOPRESSIN ACETATE 225 MICROGRAM(S): 0.1 TABLET ORAL at 01:15

## 2020-02-11 RX ADMIN — Medication 145 MILLIGRAM(S): at 12:53

## 2020-02-11 RX ADMIN — Medication 100 MILLIGRAM(S): at 05:25

## 2020-02-11 RX ADMIN — Medication 650 MILLIGRAM(S): at 05:25

## 2020-02-11 RX ADMIN — Medication 1 TABLET(S): at 12:53

## 2020-02-11 NOTE — H&P ADULT - NSICDXPASTMEDICALHX_GEN_ALL_CORE_FT
PAST MEDICAL HISTORY:  Chronic atrial fibrillation     CVA (cerebral vascular accident)     HTN (hypertension)

## 2020-02-11 NOTE — H&P ADULT - ASSESSMENT
Naye Trevizo  87 F s/p mechanical fall. No LOC, no seizure. Patient has a face and scalp laceration.  CTH shows trace L parietal SAH stable on interval scan. Neurologically intact  - Admit to Neurosurgery  - Okay for Q4 hr neurochecks  - Got Kcentra and ddavp   - Interval cTH stable.  Repeat one in AM   - Hold all AC and AP  - If imaging stable will dc in AM   -Follow up with Dr. Trinh in clinic in 2 weeks

## 2020-02-11 NOTE — CONSULT NOTE ADULT - PROBLEM SELECTOR RECOMMENDATION 4
[id number] =6  c/w metoprolol for rate control   eliquis held due to SAH - clearance for restarting a/c per neurosurgery

## 2020-02-11 NOTE — PHYSICAL THERAPY INITIAL EVALUATION ADULT - PRECAUTIONS/LIMITATIONS, REHAB EVAL
fall precautions/Pt subsequently started bleeding which prompted family to bring her to the ED for further evaluation.  CTH 2/10: Trace likely posttraumatic subarachnoid hemorrhage CT Maxillofacial 2/10: Superficial soft tissue changes without fracture. CT C-spine (-). Repeat CTH 2/11 AM: Reidentified trace left parietal subarachnoid hemorrhage appears unchanged in size. No new intracranial hemorrhage, mass effect or midline shift. CTH 2/11 PM: Similar tiny hyperdense focus in the region of the left postcentral sulcus could represent subarachnoid hemorrhage. Redemonstration of right periorbital soft tissue swelling.

## 2020-02-11 NOTE — ED ADULT NURSE REASSESSMENT NOTE - NS ED NURSE REASSESS COMMENT FT1
contacted neuro surgery resident [id number] regarding need for juan drip. Patient BP WDL as per neurosurgery, okay to d/c drip. Pt placed in position of comfort. Pt educated on call bell system and provided call bell. Bed in lowest position, wheels locked, appropriate side rails raised. Pt denies needs at this time.

## 2020-02-11 NOTE — DISCHARGE NOTE PROVIDER - NSDCQMANTICOAGCONTRA_GEN_A_CORE
Recent bleed or risk of bleeding Patient does not have atrial fibrillation/flutter/Recent bleed or risk of bleeding

## 2020-02-11 NOTE — DISCHARGE NOTE PROVIDER - NSDCCPCAREPLAN_GEN_ALL_CORE_FT
PRINCIPAL DISCHARGE DIAGNOSIS  Diagnosis: SAH (subarachnoid hemorrhage)  Assessment and Plan of Treatment: Please follow up Neurosurgeon in one week. Please call office to make appointment. Please follow up with Primary Care Physician. Please call office to make appointment.      SECONDARY DISCHARGE DIAGNOSES  Diagnosis: Fall  Assessment and Plan of Treatment: PRINCIPAL DISCHARGE DIAGNOSIS  Diagnosis: SAH (subarachnoid hemorrhage)  Assessment and Plan of Treatment: Please follow up Neurosurgeon on February 26 at 1230 pm.   Please follow up with Primary Care Physician. Please call office to make appointment.      SECONDARY DISCHARGE DIAGNOSES  Diagnosis: Chronic atrial fibrillation  Assessment and Plan of Treatment: please follow up with Dr. Trinh before resuming asa/eliquis      Diagnosis: Essential hypertension  Assessment and Plan of Treatment: Please make an appointment for follow up with your primary care physician after discharge.

## 2020-02-11 NOTE — PROGRESS NOTE ADULT - ASSESSMENT
87F from home, lives with daughter-in-law, w/ PMHx Afib (on Eliquis), HTN, CVA (2017) who was transferred from [facility] for trace SAH. presented to the [facility] ED after having a mechanical fall at home while hanging clothes. Patient reports that when she stepped on the bed to hang clothes, she lost balance and fell to the floor, hitting R side of the face with her eyeglasses on. Patient subsequently started bleeding which prompted family to bring her to the ED for further evaluation. Denies dizziness, chest pain, palpitations or any symptoms prior or during the event.  Pt also took Aspirin 81mg yesterday morning (11 Feb 2020 02:41)    PLAN:  -Neuro: repeat CT scan pending per Dr. Trinh  -Transaminitis. Repeat labs pending. Will discontinue Tylenol as discussed with hospitalist.   -Continue norvasc and toprol for hypertension.  -Encouraged incentive spirometry.  -Continue home meds  -DVT ppx: venodynes  -PT: pending    Above discussed with Dr. Trinh  [facility] #[id number]    Assessment:  Please Check When Present   []  GCS  E   V  M     Heart Failure: []Acute, [] acute on chronic , []chronic  Heart Failure:  [] Diastolic (HFpEF), [] Systolic (HFrEF), []Combined (HFpEF and HFrEF), [] RHF, [] Pulm HTN, [] Other    [] ARMAAN, [] ATN, [] AIN, [] other  [] CKD1, [] CKD2, [] CKD 3, [] CKD 4, [] CKD 5, []ESRD    Encephalopathy: [] Metabolic, [] Hepatic, [] toxic, [] Neurological, [] Other    Abnormal Nurtitional Status: [] malnurtition (see nutrition note), [ ]underweight: BMI < 19, [] morbid obesity: BMI >40, [] Cachexia    [] Sepsis  [] hypovolemic shock,[] cardiogenic shock, [] hemorrhagic shock, [] neuogenic shock  [] Acute Respiratory Failure  []Cerebral edema, [] Brain compression/ herniation,   [] Functional quadriplegia  [] Acute blood loss anemia 87F from home, lives with daughter-in-law, w/ PMHx Afib (on Eliquis), HTN, CVA (2017) who was transferred from [facility] for trace SAH. presented to the [facility] ED after having a mechanical fall at home while hanging clothes. Patient reports that when she stepped on the bed to hang clothes, she lost balance and fell to the floor, hitting R side of the face with her eyeglasses on. Patient subsequently started bleeding which prompted family to bring her to the ED for further evaluation. Denies dizziness, chest pain, palpitations or any symptoms prior or during the event.  Pt also took Aspirin 81mg yesterday morning (11 Feb 2020 02:41)    PLAN:  -Neuro: repeat CT scan pending per Dr. Trinh  -Keppra x 7 days as per Dr. Trinh   -Transaminitis. Repeat labs pending. Will discontinue Tylenol as discussed with hospitalist.   -Continue norvasc and toprol for hypertension.  -Encouraged incentive spirometry.  -Continue home meds  -DVT ppx: venodynes  -PT: pending    Above discussed with Dr. Trinh  [facility] #[id number]    Assessment:  Please Check When Present   []  GCS  E   V  M     Heart Failure: []Acute, [] acute on chronic , []chronic  Heart Failure:  [] Diastolic (HFpEF), [] Systolic (HFrEF), []Combined (HFpEF and HFrEF), [] RHF, [] Pulm HTN, [] Other    [] ARMAAN, [] ATN, [] AIN, [] other  [] CKD1, [] CKD2, [] CKD 3, [] CKD 4, [] CKD 5, []ESRD    Encephalopathy: [] Metabolic, [] Hepatic, [] toxic, [] Neurological, [] Other    Abnormal Nurtitional Status: [] malnurtition (see nutrition note), [ ]underweight: BMI < 19, [] morbid obesity: BMI >40, [] Cachexia    [] Sepsis  [] hypovolemic shock,[] cardiogenic shock, [] hemorrhagic shock, [] neuogenic shock  [] Acute Respiratory Failure  []Cerebral edema, [] Brain compression/ herniation,   [] Functional quadriplegia  [] Acute blood loss anemia

## 2020-02-11 NOTE — PROGRESS NOTE ADULT - SUBJECTIVE AND OBJECTIVE BOX
SUBJECTIVE: Patient seen and examined at bedside. Complains of slight headache.     OVERNIGHT EVENTS: none     Vital Signs Last 24 Hrs  T(C): 37 (11 Feb 2020 09:04), Max: 37 (11 Feb 2020 05:06)  T(F): 98.6 (11 Feb 2020 09:04), Max: 98.6 (11 Feb 2020 05:06)  HR: 102 (11 Feb 2020 09:04) (83 - 118)  BP: 144/82 (11 Feb 2020 09:04) (119/75 - 194/117)  BP(mean): --  RR: 18 (11 Feb 2020 09:04) (16 - 20)  SpO2: 95% (11 Feb 2020 09:04) (95% - 100%)    PHYSICAL EXAM:    General: No Acute Distress     Neurological: Awake, alert oriented to person, place and time, Following Commands, PERRL, EOMI, Face Symmetrical, right sided face and scalp laceration,  Speech Fluent, Moving all extremities, Muscle Strength normal in all four extremities, No Drift, Sensation to Light Touch Intact    Pulmonary: Clear to Auscultation, No Rales, No Rhonchi, No Wheezes     Cardiovascular: S1, S2, Regular Rate and Rhythm     Gastrointestinal: Soft, Nontender, Nondistended     LABS:                        10.9   8.67  )-----------( 193      ( 11 Feb 2020 08:46 )             33.9    02-10    137  |  100  |  29<H>  ----------------------------<  115<H>  4.3   |  22  |  0.97    Ca    9.4      10 Feb 2020 22:34    TPro  6.6  /  Alb  3.6  /  TBili  0.6  /  DBili  x   /  AST  62<H>  /  ALT  36  /  AlkPhos  83  02-10  PT/INR - ( 10 Feb 2020 19:45 )   PT: 12.2 sec;   INR: 1.10 ratio         PTT - ( 10 Feb 2020 19:45 )  PTT:35.0 sec      02-10 @ 07:01  -  02-11 @ 07:00  --------------------------------------------------------  IN: 240 mL / OUT: 400 mL / NET: -160 mL      DIET: regular diet     IMAGING:     < from: CT Cervical Spine No Cont (02.10.20 @ 18:57) >  FINDINGS:    The vertebral body heights and alignment are maintained. There is no fracture. There is multilevel disc space narrowing with mild spondylosis. There is multilevel facet arthropathy. There is degenerative change at the C1-C2 level. There is uncovertebral joint hypertrophy..    There is no prevertebral soft tissue swelling. The odontoid is intact.     Evaluation of the spinal canal is limited on a CT exam.     The lung apices are remarkable for evidence of left pleural effusion and scarring in the apices are    IMPRESSION:    No evidence of acute traumatic injury.    < end of copied text >    < from: CT Head No Cont (02.11.20 @ 00:23) >  FINDINGS:    Reidentified 3 mm high density focus within the left parietal lobe (series 3, image 21) likely representing subarachnoid hemorrhage, no significant interval change.    No additional acute intracranial hemorrhage, extra-axial collection, mass effect, midline shift, central herniation, hydrocephalus or acute territorial infarct.     There is mild cerebral volume loss. There is moderate patchy white matter hypoattenuation which is nonspecific in etiology but likely related to chronic microvascular ischemic disease.    The ventricles and sulci are normal in size and configuration for age.     The visualized paranasal sinuses are clear. The mastoid air cells and middle ear cavities are grossly clear.    Reidentified right periorbital soft tissue swelling containing trace hyperdensity. No acute displaced calvarium fracture.    IMPRESSION:     Reidentified trace left parietalsubarachnoid hemorrhage appears unchanged in size. No new intracranial hemorrhage, mass effect or midline shift. If there are new or persistent symptoms, consider further evaluation with MRI provided no contraindications.    < end of copied text >

## 2020-02-11 NOTE — PHYSICAL THERAPY INITIAL EVALUATION ADULT - TRANSFER TRAINING, PT EVAL
GOAL: Pt will perform ALL transfers (I) w/use of least restrictive assistive device as needed, in 4 weeks.

## 2020-02-11 NOTE — PHYSICAL THERAPY INITIAL EVALUATION ADULT - PERTINENT HX OF CURRENT PROBLEM, REHAB EVAL
86 yo F from home, lives with daughter-in-law, w/ PMHx Afib (on Eliquis), HTN, CVA (2017) who was transferred from [facility] with trace SAH. Pt presented to the [facility] ED after having a mechanical fall at home while hanging clothes. Patient reports that when she stepped on the bed to hang clothes, she lost balance and fell to the floor, hitting R side of the face with her eyeglasses on.

## 2020-02-11 NOTE — PHYSICAL THERAPY INITIAL EVALUATION ADULT - ADDITIONAL COMMENTS
Pt reports she lives with her daughter-in-law in a PH. +12 steps to negotiate with HR. Was ambulating (I) with use of SC intermittently. Was independent with ADLS, however states her daughter-in-law would "watch her" as needed.

## 2020-02-11 NOTE — DISCHARGE NOTE NURSING/CASE MANAGEMENT/SOCIAL WORK - PATIENT PORTAL LINK FT
You can access the FollowMyHealth Patient Portal offered by [facility] by registering at the following website: [email or web address]. By joining [facility]’s FollowMyHealth portal, you will also be able to view your health information using other applications (apps) compatible with our system.

## 2020-02-11 NOTE — DISCHARGE NOTE PROVIDER - NSDCMRMEDTOKEN_GEN_ALL_CORE_FT
Eliquis 5 mg oral tablet: 1 tab(s) orally 2 times a day  fenofibrate 160 mg oral tablet: 1 tab(s) orally once a day  losartan 100 mg oral tablet: 1 tab(s) orally once a day  Norvasc 10 mg oral tablet: 1 tab(s) orally once a day  pravastatin 10 mg oral tablet: 1 tab(s) orally once a day  Rollator s/p mech fall :   Toprol- mg oral tablet, extended release: 1 tab(s) orally once a day bisacodyl 5 mg oral delayed release tablet: 1 tab(s) orally once a day, As needed, Constipation  fenofibrate 160 mg oral tablet: 1 tab(s) orally once a day  folic acid 1 mg oral tablet: 1 tab(s) orally once a day  levETIRAcetam 500 mg oral tablet: 1 tab(s) orally 2 times a day MDD:2  losartan 100 mg oral tablet: 1 tab(s) orally once a day  Multiple Vitamins oral tablet: 1 tab(s) orally once a day  Norvasc 10 mg oral tablet: 1 tab(s) orally once a day  pravastatin 10 mg oral tablet: 1 tab(s) orally once a day  Rollator s/p mech fall :   senna oral tablet: 2 tab(s) orally once a day (at bedtime), As needed, Constipation  Toprol- mg oral tablet, extended release: 1 tab(s) orally once a day

## 2020-02-11 NOTE — DISCHARGE NOTE PROVIDER - HOSPITAL COURSE
87F from home, lives with daughter-in-law, w/ PMHx Afib (on Eliquis), HTN, CVA (2017) who was transferred from [facility] for trace SAH. presented to the [facility] ED after having a mechanical fall at home while hanging clothes. Patient reports that when she stepped on the bed to hang clothes, she lost balance and fell to the floor, hitting R side of the face with her eyeglasses on. Patient subsequently started bleeding which prompted family to bring her to the ED for further evaluation. Denies dizziness, chest pain, palpitations or any symptoms prior or during the event.  Pt also took Aspirin 81mg yesterday morning. Patient was given Kcentra and ddavp in the ED. Her repeat CT were stable and reviewed by neurosurgery team. While in the hospital she was being followed by the hospitalist for transaminitis which improved and was cleared by the hospitalist for discharge. She was seen by PT and they recommended home with home PT. On the day of discharge patient is neurologically and hemodynamically stable and clear for discharge home. 87F from home, lives with daughter-in-law, w/ PMHx Afib (on Eliquis), HTN, CVA (2017) who was transferred from [facility] for trace SAH. presented to the [facility] ED after having a mechanical fall at home while hanging clothes. Patient reports that when she stepped on the bed to hang clothes, she lost balance and fell to the floor, hitting R side of the face with her eyeglasses on. Patient subsequently started bleeding which prompted family to bring her to the ED for further evaluation. Denies dizziness, chest pain, palpitations or any symptoms prior or during the event.  Pt also took Aspirin 81mg yesterday morning. Patient was given Kcentra and ddavp in the ED. Her repeat CT were stable and reviewed by neurosurgery team. Patient to be on keppra x 7 days as per Dr. Trinh. While in the hospital she was being followed by the hospitalist for transaminitis which improved and was cleared by the hospitalist for discharge. She was seen by PT and they recommended home with home PT. On the day of discharge patient is neurologically and hemodynamically stable and clear for discharge home. 87F from home, lives with daughter-in-law, w/ PMHx Afib (on Eliquis), HTN, CVA (2017) who was transferred from [facility] for trace SAH. presented to the [facility] ED after having a mechanical fall at home while hanging clothes. Patient reports that when she stepped on the bed to hang clothes, she lost balance and fell to the floor, hitting R side of the face with her eyeglasses on. Patient subsequently started bleeding which prompted family to bring her to the ED for further evaluation. Denies dizziness, chest pain, palpitations or any symptoms prior or during the event.  Pt also took Aspirin 81mg yesterday morning. Patient was given Kcentra and ddavp in the ED. Her repeat CT were stable and reviewed by neurosurgery team. Patient to be on keppra x 7 days as per Dr. Trinh. While in the hospital she was being followed by the hospitalist for transaminitis which improved and was cleared by the hospitalist for discharge. She was seen by PT and they recommended home with home PT. On the day of discharge patient is neurologically and hemodynamically stable and clear for discharge home.  Called and spoke to son and updated him of discharge plan and follow up appointment.

## 2020-02-11 NOTE — CHART NOTE - NSCHARTNOTEFT_GEN_A_CORE
CAPRINI SCORE [CLOT] Score on Admission for     AGE RELATED RISK FACTORS                                                       MOBILITY RELATED FACTORS  [ ] Age 41-60 years                                            (1 Point)                  [ ] Bed rest                                                        (1 Point)  [ ] Age: 61-74 years                                           (2 Points)                 [ ] Plaster cast                                                   (2 Points)  [x ] Age= 75 years                                              (3 Points)                 [ ] Bed bound for more than 72 hours                 (2 Points)    DISEASE RELATED RISK FACTORS                                               GENDER SPECIFIC FACTORS  [ ] Edema in the lower extremities                       (1 Point)                  [ ] Pregnancy                                                     (1 Point)  [ ] Varicose veins                                               (1 Point)                  [ ] Post-partum < 6 weeks                                   (1 Point)             [x ] BMI > 25 Kg/m2                                            (1 Point)                  [ ] Hormonal therapy  or oral contraception          (1 Point)                 [ ] Sepsis (in the previous month)                        (1 Point)                  [ ] History of pregnancy complications                 (1 point)  [ ] Pneumonia or serious lung disease                                               [ ] Unexplained or recurrent                     (1 Point)           (in the previous month)                               (1 Point)  [ ] Abnormal pulmonary function test                     (1 Point)                 SURGERY RELATED RISK FACTORS (include planned surgeries)  [ ] Acute myocardial infarction                              (1 Point)                 [ ]  Section                                             (1 Point)  [ ] Congestive heart failure (in the previous month)  (1 Point)         [ ] Minor surgery                                                  (1 Point)   [ ] Inflammatory bowel disease                             (1 Point)                 [ ] Arthroscopic surgery                                        (2 Points)  [ ] Central venous access                                      (2 Points)                [ ] General surgery lasting more than 45 minutes   (2 Points)       [ ] Stroke (in the previous month)                          (5 Points)               [ ] Elective arthroplasty                                         (5 Points)            [ ] current or past malignancy                              (2 Points)                                                                                                       HEMATOLOGY RELATED FACTORS                                                 TRAUMA RELATED RISK FACTORS  [ ] Prior episodes of VTE                                     (3 Points)                [ ] Fracture of the hip, pelvis, or leg                       (5 Points)  [ ] Positive family history for VTE                         (3 Points)                 [ ] Acute spinal cord injury (in the previous month)  (5 Points)  [ ] Prothrombin 02695 A                                     (3 Points)                 [ ] Paralysis  (less than 1 month)                             (5 Points)  [ ] Factor V Leiden                                             (3 Points)                  [ ] Multiple Trauma within 1 month                        (5 Points)  [ ] Lupus anticoagulants                                     (3 Points)                                                           [ ] Anticardiolipin antibodies                               (3 Points)                                                       [ ] High homocysteine in the blood                      (3 Points)                                             [ ] Other congenital or acquired thrombophilia      (3 Points)                                                [ ] Heparin induced thrombocytopenia                  (3 Points)                                          Total Score [      4    ]    Risk:  Very low 0   Low 1 to 2   Moderate 3 to 4   High =5       VTE Prophylasix Recommednations:  [x ] mechanical pneumatic compression devices                                      [ ] contraindicated: _____________________  [ ] chemo prophylasix                                                                                   [ x] contraindicated bleeding risk     **** HIGH LIKELIHOOD DVT PRESENT ON ADMISSION  [ ] (please order LE dopplers within 24 hours of admission)

## 2020-02-11 NOTE — DISCHARGE NOTE NURSING/CASE MANAGEMENT/SOCIAL WORK - NSDCPEPTSTRK_GEN_ALL_CORE
Risk factors for stroke/Stroke support groups for patients, families, and friends/Prescribed medications/Stroke warning signs and symptoms/Signs and symptoms of stroke/Stroke education booklet/Call 911 for stroke/Need for follow up after discharge

## 2020-02-11 NOTE — H&P ADULT - HISTORY OF PRESENT ILLNESS
· HPI Objective Statement: 87F from home, lives with daughter-in-law, w/ PMHx Afib (on Eliquis), HTN, CVA (2017) who was transferred from [facility] for trace SAH. presented to the [facility] ED after having a mechanical fall at home while hanging clothes. Patient reports that when she stepped on the bed to hang clothes, she lost balance and fell to the floor, hitting R side of the face with her eyeglasses on. Patient subsequently started bleeding which prompted family to bring her to the ED for further evaluation. Denies dizziness, chest pain, palpitations or any symptoms prior or during the event.  Pt also took Aspirin 81mg yesterday morning

## 2020-02-11 NOTE — CONSULT NOTE ADULT - SUBJECTIVE AND OBJECTIVE BOX
cc: mechanical fall    HPI:  87F from home, lives with daughter-in-law, w/ PMHx Afib (on Eliquis), HTN, CVA (2017) who was transferred from [facility] for trace SAH. patient had a mechanical fall at home while hanging clothes (per daughter in law). Patient reports that when she stepped on the bed to hang clothes, she lost balance and fell to the floor, hitting R side of the face with her eyeglasses on. Patient subsequently started bleeding which prompted family to bring her to the ED for further evaluation. pt denied dizziness, chest pain, palpitations or any symptoms prior or during the event. Pt also took Aspirin 81mg day of admission. CT head showed trace likely traumatic SAH. patient c/o mild headache. no chest pain, sob, or palpiations. no focal weakness.       PAST MEDICAL & SURGICAL HISTORY:  CVA (cerebral vascular accident)  Chronic atrial fibrillation  HTN (hypertension)  No significant past surgical history      Review of Systems:   CONSTITUTIONAL: No fever  EYES: No eye pain, visual disturbances  ENMT:  No difficulty hearing,   NECK: No pain or stiffness  RESPIRATORY: No cough, No shortness of breath  CARDIOVASCULAR: No chest pain, palpitations,  GASTROINTESTINAL: No abdominal or epigastric pain. No nausea, vomiting,  GENITOURINARY: No dysuria,   NEUROLOGICAL: + headaches,   SKIN: No rashes  ENDOCRINE: No heat or cold intolerance  MUSCULOSKELETAL: No joint pain or swelling;   PSYCHIATRIC: No depression,   ALLERY AND IMMUNOLOGIC: No hives or eczema    Allergies    No Known Allergies        Social History: denies smoking or etoh abuse    FAMILY HISTORY:  son has lymphoma  no family history of heart disease per daughter in law      HOME MEDICATIONS:   HCTZ 12.5mg qd  MVI  ca+Vit D  asa 81mg qd  vitamin C 500mg qd  losartan 100mg qd  Eliquis 2.5mg bid   norvasc 5mg qd  pravastatin 10mg qhs   toprol 100mg qd     (?medication list does not include fenofibrate)      MEDICATIONS  (STANDING):  amLODIPine   Tablet 10 milliGRAM(s) Oral daily  fenofibrate Tablet 145 milliGRAM(s) Oral daily  folic acid 1 milliGRAM(s) Oral daily  levETIRAcetam 500 milliGRAM(s) Oral two times a day  losartan 100 milliGRAM(s) Oral daily  metoprolol succinate  milliGRAM(s) Oral daily  multivitamin 1 Tablet(s) Oral daily    MEDICATIONS  (PRN):  bisacodyl 5 milliGRAM(s) Oral daily PRN Constipation  ondansetron Injectable 4 milliGRAM(s) IV Push every 6 hours PRN Nausea and/or Vomiting  senna 2 Tablet(s) Oral at bedtime PRN Constipation      Vital Signs Last 24 Hrs  T(C): 37 (2020 09:04), Max: 37 (2020 05:06)  T(F): 98.6 (2020 09:04), Max: 98.6 (2020 05:06)  HR: 102 (2020 09:04) (83 - 118)  BP: 144/82 (2020 09:04) (119/75 - 194/117)  BP(mean): --  RR: 18 (2020 09:04) (16 - 20)  SpO2: 95% (2020 09:04) (95% - 100%)  CAPILLARY BLOOD GLUCOSE        I&O's Summary    10 Feb 2020 07:01  -  2020 07:00  --------------------------------------------------------  IN: 240 mL / OUT: 400 mL / NET: -160 mL        PHYSICAL EXAM:  GENERAL: NAD, breathing normal  HEAD:  right facial hematoma   EYES: conjunctiva and sclera clear  NECK: supple, No JVD  CHEST/LUNG: CTA b/l  HEART: S1 S2 RRR  ABDOMEN: +BS Soft, NT/ND  EXTREMITIES:  2+ DP Pulses, No c/c. no LE edema  NEUROLOGY: AAOx3, no facial droop, 5/5 MS b/l UE and LE   SKIN: No rashes or lesions      LABS:                        10.9   8.67  )-----------( 193      ( 2020 08:46 )             33.9     02-11    138  |  96  |  23  ----------------------------<  149<H>  3.8   |  23  |  0.87    Ca    9.7      2020 08:46    TPro  6.8  /  Alb  4.0  /  TBili  1.0  /  DBili  x   /  AST  51<H>  /  ALT  35  /  AlkPhos  83  02-11    PT/INR - ( 10 Feb 2020 19:45 )   PT: 12.2 sec;   INR: 1.10 ratio         PTT - ( 10 Feb 2020 19:45 )  PTT:35.0 sec      Urinalysis Basic - ( 10 Feb 2020 22:34 )    Color: Colorless / Appearance: Clear / S.007 / pH: x  Gluc: x / Ketone: Negative  / Bili: Negative / Urobili: Negative   Blood: x / Protein: Negative / Nitrite: Negative   Leuk Esterase: Negative / RBC: 1 /hpf / WBC 2 /HPF   Sq Epi: x / Non Sq Epi: 0 /hpf / Bacteria: Many        RADIOLOGY & ADDITIONAL TESTS:    Imaging Personally Reviewed: Ct head reviewed- Similar tiny hyperdense focus in the region of the left postcentral sulcus could represent subarachnoid hemorrhage.    Redemonstration of right periorbital soft tissue swelling.    Consultant(s) Notes Reviewed:      Care Discussed with Consultants/Other Providers: d/w Neurosurgery SOLA Cope regarding plan of care

## 2020-02-11 NOTE — DISCHARGE NOTE PROVIDER - NSDCFUADDAPPT_GEN_ALL_CORE_FT
Please follow up Neurosurgeon in one week. Please call office to make appointment. Please follow up with Primary Care Physician. Please call office to make appointment. Your follow up appointment is for February 26 at 1230pm. Please follow up with Primary Care Physician. Please call office to make appointment.

## 2020-02-11 NOTE — DISCHARGE NOTE PROVIDER - NSDCFUADDINST_GEN_ALL_CORE_FT
Please don't resume your eliquis and aspirin until cleared by your neurosurgeon (Dr. Trinh)  NO heavy lifting, strenous activity, twisting, bending, driving, or working until cleared by your physician.  Please return to the emergency department if you develop changes in mental status, seizures, fainting, dizziness, changes in vision, lethargy, nausea, vomiting, chest pain, shortness of breathe or severe pain.

## 2020-02-11 NOTE — PHYSICAL THERAPY INITIAL EVALUATION ADULT - GENERAL OBSERVATIONS, REHAB EVAL
Pt rec'd semi-supine in bed in NAD, VSS, +IVL, +ecchymosis to R periorbital/dressing C/D/I, agreeable to PT

## 2020-02-11 NOTE — CONSULT NOTE ADULT - ASSESSMENT
87F from home, lives with daughter-in-law, w/ PMHx Afib (on Eliquis), HTN, CVA (2017) presented to OSH after mechanical fall found to have small SAH transferred to [facility] for neurosurgerical evaluation.

## 2020-02-11 NOTE — DISCHARGE NOTE PROVIDER - CARE PROVIDER_API CALL
Ba Trinh)  Neurological Surgery  [address] [place] [address], [address] [place] [address]  Phone: [phone]  Fax: [phone]  Follow Up Time:

## 2020-02-11 NOTE — PHYSICAL THERAPY INITIAL EVALUATION ADULT - PLANNED THERAPY INTERVENTIONS, PT EVAL
gait training/strengthening/stair negotiation: GOAL: Pt will ascend/descend (12) steps (I) with U HR and step over step pattern in 4 weeks./transfer training/balance training

## 2020-02-12 VITALS — HEART RATE: 72 BPM | SYSTOLIC BLOOD PRESSURE: 147 MMHG | DIASTOLIC BLOOD PRESSURE: 72 MMHG

## 2020-02-12 DIAGNOSIS — R74.0 NONSPECIFIC ELEVATION OF LEVELS OF TRANSAMINASE AND LACTIC ACID DEHYDROGENASE [LDH]: ICD-10-CM
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
## 2020-02-12 LAB
-  AMIKACIN: SIGNIFICANT CHANGE UP
-  AMPICILLIN/SULBACTAM: SIGNIFICANT CHANGE UP
-  AMPICILLIN: SIGNIFICANT CHANGE UP
-  AZTREONAM: SIGNIFICANT CHANGE UP
-  CEFAZOLIN: SIGNIFICANT CHANGE UP
-  CEFEPIME: SIGNIFICANT CHANGE UP
-  CEFOXITIN: SIGNIFICANT CHANGE UP
-  CEFTRIAXONE: SIGNIFICANT CHANGE UP
-  CIPROFLOXACIN: SIGNIFICANT CHANGE UP
-  GENTAMICIN: SIGNIFICANT CHANGE UP
-  IMIPENEM: SIGNIFICANT CHANGE UP
-  LEVOFLOXACIN: SIGNIFICANT CHANGE UP
-  MEROPENEM: SIGNIFICANT CHANGE UP
-  NITROFURANTOIN: SIGNIFICANT CHANGE UP
-  PIPERACILLIN/TAZOBACTAM: SIGNIFICANT CHANGE UP
-  TIGECYCLINE: SIGNIFICANT CHANGE UP
-  TOBRAMYCIN: SIGNIFICANT CHANGE UP
-  TRIMETHOPRIM/SULFAMETHOXAZOLE: SIGNIFICANT CHANGE UP
ALBUMIN SERPL ELPH-MCNC: 3.1 G/DL — LOW (ref 3.3–5)
ALP SERPL-CCNC: 67 U/L — SIGNIFICANT CHANGE UP (ref 40–120)
ALT FLD-CCNC: 26 U/L — SIGNIFICANT CHANGE UP (ref 10–45)
ANION GAP SERPL CALC-SCNC: 12 MMOL/L — SIGNIFICANT CHANGE UP (ref 5–17)
AST SERPL-CCNC: 43 U/L — HIGH (ref 10–40)
BILIRUB SERPL-MCNC: 0.6 MG/DL — SIGNIFICANT CHANGE UP (ref 0.2–1.2)
BUN SERPL-MCNC: 27 MG/DL — HIGH (ref 7–23)
CALCIUM SERPL-MCNC: 8.7 MG/DL — SIGNIFICANT CHANGE UP (ref 8.4–10.5)
CHLORIDE SERPL-SCNC: 98 MMOL/L — SIGNIFICANT CHANGE UP (ref 96–108)
CHOLEST SERPL-MCNC: 113 MG/DL — SIGNIFICANT CHANGE UP (ref 10–199)
CO2 SERPL-SCNC: 27 MMOL/L — SIGNIFICANT CHANGE UP (ref 22–31)
CREAT SERPL-MCNC: 1.09 MG/DL — SIGNIFICANT CHANGE UP (ref 0.5–1.3)
CULTURE RESULTS: SIGNIFICANT CHANGE UP
GLUCOSE SERPL-MCNC: 94 MG/DL — SIGNIFICANT CHANGE UP (ref 70–99)
HDLC SERPL-MCNC: 55 MG/DL — SIGNIFICANT CHANGE UP
LIPID PNL WITH DIRECT LDL SERPL: 48 MG/DL — SIGNIFICANT CHANGE UP
METHOD TYPE: SIGNIFICANT CHANGE UP
ORGANISM # SPEC MICROSCOPIC CNT: SIGNIFICANT CHANGE UP
POTASSIUM SERPL-MCNC: 3.5 MMOL/L — SIGNIFICANT CHANGE UP (ref 3.5–5.3)
POTASSIUM SERPL-SCNC: 3.5 MMOL/L — SIGNIFICANT CHANGE UP (ref 3.5–5.3)
PROT SERPL-MCNC: 5.2 G/DL — LOW (ref 6–8.3)
SODIUM SERPL-SCNC: 137 MMOL/L — SIGNIFICANT CHANGE UP (ref 135–145)
SPECIMEN SOURCE: SIGNIFICANT CHANGE UP
TOTAL CHOLESTEROL/HDL RATIO MEASUREMENT: 2 RATIO — LOW (ref 3.3–7.1)
TRIGL SERPL-MCNC: 46 MG/DL — SIGNIFICANT CHANGE UP (ref 10–149)

## 2020-02-12 PROCEDURE — 97161 PT EVAL LOW COMPLEX 20 MIN: CPT

## 2020-02-12 PROCEDURE — 80061 LIPID PANEL: CPT

## 2020-02-12 PROCEDURE — 99238 HOSP IP/OBS DSCHRG MGMT 30/<: CPT

## 2020-02-12 PROCEDURE — 80053 COMPREHEN METABOLIC PANEL: CPT

## 2020-02-12 PROCEDURE — 93005 ELECTROCARDIOGRAM TRACING: CPT

## 2020-02-12 PROCEDURE — 87186 SC STD MICRODIL/AGAR DIL: CPT

## 2020-02-12 PROCEDURE — 87086 URINE CULTURE/COLONY COUNT: CPT

## 2020-02-12 PROCEDURE — 99232 SBSQ HOSP IP/OBS MODERATE 35: CPT

## 2020-02-12 PROCEDURE — 99222 1ST HOSP IP/OBS MODERATE 55: CPT

## 2020-02-12 PROCEDURE — 96375 TX/PRO/DX INJ NEW DRUG ADDON: CPT

## 2020-02-12 PROCEDURE — 99285 EMERGENCY DEPT VISIT HI MDM: CPT | Mod: 25

## 2020-02-12 PROCEDURE — 97530 THERAPEUTIC ACTIVITIES: CPT

## 2020-02-12 PROCEDURE — 81001 URINALYSIS AUTO W/SCOPE: CPT

## 2020-02-12 PROCEDURE — 70450 CT HEAD/BRAIN W/O DYE: CPT

## 2020-02-12 PROCEDURE — 85027 COMPLETE CBC AUTOMATED: CPT

## 2020-02-12 PROCEDURE — 96366 THER/PROPH/DIAG IV INF ADDON: CPT

## 2020-02-12 PROCEDURE — 96365 THER/PROPH/DIAG IV INF INIT: CPT

## 2020-02-12 PROCEDURE — 97116 GAIT TRAINING THERAPY: CPT

## 2020-02-12 RX ORDER — LEVETIRACETAM 250 MG/1
1 TABLET, FILM COATED ORAL
Qty: 14 | Refills: 0
Start: 2020-02-12

## 2020-02-12 RX ORDER — SENNA PLUS 8.6 MG/1
2 TABLET ORAL
Qty: 0 | Refills: 0 | DISCHARGE
Start: 2020-02-12

## 2020-02-12 RX ORDER — APIXABAN 2.5 MG/1
1 TABLET, FILM COATED ORAL
Qty: 0 | Refills: 0 | DISCHARGE

## 2020-02-12 RX ORDER — ACETAMINOPHEN 500 MG
650 TABLET ORAL ONCE
Refills: 0 | Status: COMPLETED | OUTPATIENT
Start: 2020-02-12 | End: 2020-02-12

## 2020-02-12 RX ORDER — FOLIC ACID 0.8 MG
1 TABLET ORAL
Qty: 0 | Refills: 0 | DISCHARGE
Start: 2020-02-12

## 2020-02-12 RX ADMIN — LOSARTAN POTASSIUM 100 MILLIGRAM(S): 100 TABLET, FILM COATED ORAL at 05:56

## 2020-02-12 RX ADMIN — AMLODIPINE BESYLATE 5 MILLIGRAM(S): 2.5 TABLET ORAL at 05:58

## 2020-02-12 RX ADMIN — Medication 650 MILLIGRAM(S): at 13:00

## 2020-02-12 RX ADMIN — Medication 1 MILLIGRAM(S): at 11:07

## 2020-02-12 RX ADMIN — LEVETIRACETAM 500 MILLIGRAM(S): 250 TABLET, FILM COATED ORAL at 05:56

## 2020-02-12 RX ADMIN — Medication 1 TABLET(S): at 11:07

## 2020-02-12 RX ADMIN — Medication 100 MILLIGRAM(S): at 05:56

## 2020-02-12 RX ADMIN — Medication 145 MILLIGRAM(S): at 11:07

## 2020-02-12 NOTE — PROGRESS NOTE ADULT - SUBJECTIVE AND OBJECTIVE BOX
SUBJECTIVE: Patient seen and examined at bedside. Denies any headache at time. Called and spoke with son and notified him of discharge plan and importance of follow up appointment.     OVERNIGHT EVENTS: none     Vital Signs Last 24 Hrs  T(C): 36.7 (12 Feb 2020 08:00), Max: 37 (11 Feb 2020 22:25)  T(F): 98 (12 Feb 2020 08:00), Max: 98.6 (11 Feb 2020 22:25)  HR: 77 (12 Feb 2020 08:32) (69 - 89)  BP: 135/69 (12 Feb 2020 08:32) (128/75 - 162/82)  BP(mean): --  RR: 16 (12 Feb 2020 08:00) (16 - 18)  SpO2: 98% (12 Feb 2020 08:00) (93% - 99%)    PHYSICAL EXAM:    General: No Acute Distress     Neurological: Awake, alert oriented to person, place and time, Following Commands, PERRL, EOMI, Face Symmetrical, right sided face and scalp laceration,  Speech Fluent, Moving all extremities, Muscle Strength normal in all four extremities, No Drift, Sensation to Light Touch Intact    Pulmonary: Clear to Auscultation, No Rales, No Rhonchi, No Wheezes     Cardiovascular: S1, S2, Regular Rate and Rhythm     Gastrointestinal: Soft, Nontender, Nondistended     LABS:                                   10.9   8.67  )-----------( 193      ( 11 Feb 2020 08:46 )             33.9   02-12    137  |  98  |  27<H>  ----------------------------<  94  3.5   |  27  |  1.09    Ca    8.7      12 Feb 2020 07:11    TPro  5.2<L>  /  Alb  3.1<L>  /  TBili  0.6  /  DBili  x   /  AST  43<H>  /  ALT  26  /  AlkPhos  67  02-12  LIVER FUNCTIONS - ( 12 Feb 2020 07:11 )  Alb: 3.1 g/dL / Pro: 5.2 g/dL / ALK PHOS: 67 U/L / ALT: 26 U/L / AST: 43 U/L / GGT: x               DIET: regular diet     IMAGING:     < from: CT Head No Cont (02.11.20 @ 10:52) >  NTERPRETATION:  Clinical information: Follow up subarachnoid hemorrhage.    Description: A noncontrast head CT was performed. Axial images were performed from the skull base to the vertex with coronal/sagittal reconstructions.    Comparison: CT head 2/11/2020 at 0023; CT head 2/10/2020, MR brain 11/3/2017    Findings:    Similar tiny hyperdense focus in the region of the left postcentral sulcus could represent subarachnoid hemorrhage.    No hydrocephalus, midline shift, mass effect, vasogenic edema, parenchymal hemorrhage, or CT evidence of acute territorial infarct. Moderate white matter microvascular ischemic disease.    No displaced calvarial fracture. Right periorbital soft tissue swelling.    IMPRESSION:    Similar tiny hyperdense focus in the region of the left postcentral sulcus could represent subarachnoid hemorrhage.    Redemonstration of right periorbital soft tissue swelling.    < end of copied text >

## 2020-02-12 NOTE — PROGRESS NOTE ADULT - ATTENDING COMMENTS
As per above PA note.  PT is currently alert, awake in NAD.  She is walking with walker.  has facial abrasions/contusions on right.  CT with small amount of left parietal TSAH, unchanged on repeat imaging.  Stop Eliquis and ASA.  Observe as Eliquis is wearing off.
Dr. VERO ForteUniversity Hospitals Parma Medical Centerist  [phone]

## 2020-02-12 NOTE — PROGRESS NOTE ADULT - PROBLEM SELECTOR PLAN 2
no abdominal pain, n/v, tolerating diet   LFTs trending down - no need to continue to trend  no need for abd us at this time  patient to follow up with pmd in 1-2 weeks

## 2020-02-12 NOTE — PROGRESS NOTE ADULT - ASSESSMENT
87F from home, lives with daughter-in-law, w/ PMHx Afib (on Eliquis), HTN, CVA (2017) presented to OSH after mechanical fall found to have small SAH transferred to [facility] for neurosurgerical evaluation.

## 2020-02-12 NOTE — PROGRESS NOTE ADULT - SUBJECTIVE AND OBJECTIVE BOX
Patient is a 87y old  Female who presents with a chief complaint of tSAH (2020 11:49)        SUBJECTIVE / OVERNIGHT EVENTS: mild headache      MEDICATIONS  (STANDING):  amLODIPine   Tablet 5 milliGRAM(s) Oral daily  fenofibrate Tablet 145 milliGRAM(s) Oral daily  folic acid 1 milliGRAM(s) Oral daily  levETIRAcetam 500 milliGRAM(s) Oral two times a day  losartan 100 milliGRAM(s) Oral daily  metoprolol succinate  milliGRAM(s) Oral daily  multivitamin 1 Tablet(s) Oral daily    MEDICATIONS  (PRN):  bisacodyl 5 milliGRAM(s) Oral daily PRN Constipation  ondansetron Injectable 4 milliGRAM(s) IV Push every 6 hours PRN Nausea and/or Vomiting  senna 2 Tablet(s) Oral at bedtime PRN Constipation      Vital Signs Last 24 Hrs  T(C): 36.7 (2020 08:00), Max: 37 (2020 22:25)  T(F): 98 (2020 08:00), Max: 98.6 (2020 22:25)  HR: 77 (2020 08:32) (69 - 89)  BP: 135/69 (2020 08:32) (128/75 - 162/82)  BP(mean): --  RR: 16 (2020 08:00) (16 - 18)  SpO2: 98% (2020 08:00) (93% - 99%)  CAPILLARY BLOOD GLUCOSE        I&O's Summary    2020 07:  -  2020 07:00  --------------------------------------------------------  IN: 730 mL / OUT: 650 mL / NET: 80 mL    2020 07:01  -  2020 11:57  --------------------------------------------------------  IN: 360 mL / OUT: 0 mL / NET: 360 mL        PHYSICAL EXAM:  GENERAL: NAD, breathing normal  HEAD:  right facial hematoma   EYES: conjunctiva and sclera clear  NECK: supple, No JVD  CHEST/LUNG: CTA b/l  HEART: S1 S2 RRR  ABDOMEN: +BS Soft, NT/ND  EXTREMITIES:  2+ DP Pulses, No c/c. no LE edema  NEUROLOGY: AAOx3, no facial droop, 5/5 MS b/l UE and LE   SKIN: No rashes or lesions    LABS:                        10.9   8.67  )-----------( 193      ( 2020 08:46 )             33.9     02-12    137  |  98  |  27<H>  ----------------------------<  94  3.5   |  27  |  1.09    Ca    8.7      2020 07:11    TPro  5.2<L>  /  Alb  3.1<L>  /  TBili  0.6  /  DBili  x   /  AST  43<H>  /  ALT  26  /  AlkPhos  67  02-12    PT/INR - ( 10 Feb 2020 19:45 )   PT: 12.2 sec;   INR: 1.10 ratio         PTT - ( 10 Feb 2020 19:45 )  PTT:35.0 sec      Urinalysis Basic - ( 10 Feb 2020 22:34 )    Color: Colorless / Appearance: Clear / S.007 / pH: x  Gluc: x / Ketone: Negative  / Bili: Negative / Urobili: Negative   Blood: x / Protein: Negative / Nitrite: Negative   Leuk Esterase: Negative / RBC: 1 /hpf / WBC 2 /HPF   Sq Epi: x / Non Sq Epi: 0 /hpf / Bacteria: Many        RADIOLOGY & ADDITIONAL TESTS:    Imaging Personally Reviewed:  Consultant(s) Notes Reviewed:    Care Discussed with Consultants/Other Providers: d/w Neurosurgery SOLA Cope regarding LFTs

## 2020-02-12 NOTE — PROGRESS NOTE ADULT - PROBLEM SELECTOR PLAN 5
[id number] =6  c/w metoprolol for rate control   eliquis held due to SAH - clearance for restarting a/c per neurosurgery.

## 2020-02-12 NOTE — CONSULT NOTE ADULT - ASSESSMENT
A/P: 87y y.o with laceration s/p repair.  - Head elevation  - Tylenol pain prn  - Tetanus  - Bacitracin BID  - F/U 5 days  - Patient and family educated on warning signs to prompt ER return pending ER discharge      Thank You  Wallace Quan MD  Plastic Surgery  [phone]

## 2020-02-12 NOTE — CONSULT NOTE ADULT - SUBJECTIVE AND OBJECTIVE BOX
SP CAT  [id number]      87y y/o presents to the ER with lacerations after mechanical fall. Patient denies LOC, changes in vision, changes in teeth alignment, nausea or emesis.  Patient denies other injuries.    No pertinent family history in first degree relatives  MEWS Score  CVA (cerebral vascular accident)  Chronic atrial fibrillation  HTN (hypertension)  HTN (hypertension)  Subarachnoid hemorrhage following injury, no loss of consciousness, initial encounter  No significant past surgical history  No significant past surgical history        No Known Allergies      T(C): 36.7 (02-12-20 @ 13:01), Max: 37 (02-11-20 @ 22:25)  HR: 72 (02-12-20 @ 13:39) (69 - 83)  BP: 147/72 (02-12-20 @ 13:39) (130/70 - 165/92)  RR: 16 (02-12-20 @ 13:01) (16 - 18)  SpO2: 98% (02-12-20 @ 13:10) (93% - 99%)    NAD  HEENT:  EOMi. Right periorbital ecchymosis and swelling.  PERRLA.  No facial tenderness.  Intranasal: No injuries.  Intraoral: No injuries.  NO loose dentures.  Laceration: Right upper eyelid 3cm, Right lower eyelid 4cm deep to submuscular layer with necrotic tissue.  CN2-12 intact.      Procedure:  Right supraorbital and infraorbital nerve blocks.  Washout of wounds with betadine.  Excisional debridement skin including muscle layer.  Repair of frontalis and orbicularis oculi muscles.  Skin flaps widely undermined, advanced, repaired with 5.0 vicryl/6.0 nylon.  Bacitracin applied.    EXAM:  CT MAXILLOFACIAL                            PROCEDURE DATE:  02/10/2020          INTERPRETATION:  Maxillofacial CT without contrast    History injury    There is mild to moderate superficial soft tissue swelling over the right orbit. There is no fracture or air-fluid level. The deep orbital contents are within normal limits.    IMPRESSION:    Superficial soft tissue changes without fracture

## 2020-02-12 NOTE — PROGRESS NOTE ADULT - ASSESSMENT
87F from home, lives with daughter-in-law, w/ PMHx Afib (on Eliquis), HTN, CVA (2017) who was transferred from [facility] for trace SAH. presented to the [facility] ED after having a mechanical fall at home while hanging clothes. Patient reports that when she stepped on the bed to hang clothes, she lost balance and fell to the floor, hitting R side of the face with her eyeglasses on. Patient subsequently started bleeding which prompted family to bring her to the ED for further evaluation. Denies dizziness, chest pain, palpitations or any symptoms prior or during the event.  Pt also took Aspirin 81mg yesterday morning (11 Feb 2020 02:41)    PLAN:  -Neuro: repeat CT scan stable and reviewed by Dr. Trinh   -Keppra x 7 days as per Dr. Trinh   -Transaminitis improved Cleared to be discharged home by hospitalist   -Continue norvasc and toprol for hypertension.  -Encouraged incentive spirometry.  -Continue home meds  -DVT ppx: venodynes  -PT: home with home PT     Above discussed with Dr. Trinh  Spectralink #[id number]    Assessment:  Please Check When Present   []  GCS  E   V  M     Heart Failure: []Acute, [] acute on chronic , []chronic  Heart Failure:  [] Diastolic (HFpEF), [] Systolic (HFrEF), []Combined (HFpEF and HFrEF), [] RHF, [] Pulm HTN, [] Other    [] ARMAAN, [] ATN, [] AIN, [] other  [] CKD1, [] CKD2, [] CKD 3, [] CKD 4, [] CKD 5, []ESRD    Encephalopathy: [] Metabolic, [] Hepatic, [] toxic, [] Neurological, [] Other    Abnormal Nurtitional Status: [] malnurtition (see nutrition note), [ ]underweight: BMI < 19, [] morbid obesity: BMI >40, [] Cachexia    [] Sepsis  [] hypovolemic shock,[] cardiogenic shock, [] hemorrhagic shock, [] neuogenic shock  [] Acute Respiratory Failure  []Cerebral edema, [] Brain compression/ herniation,   [] Functional quadriplegia  [] Acute blood loss anemia

## 2020-02-14 ENCOUNTER — EMERGENCY (EMERGENCY)
Facility: HOSPITAL | Age: 85
LOS: 1 days | Discharge: HOME | End: 2020-02-14
Attending: EMERGENCY MEDICINE
Payer: MEDICAID

## 2020-02-14 VITALS
SYSTOLIC BLOOD PRESSURE: 156 MMHG | RESPIRATION RATE: 16 BRPM | OXYGEN SATURATION: 96 % | DIASTOLIC BLOOD PRESSURE: 95 MMHG | WEIGHT: 100.09 LBS | HEART RATE: 89 BPM | HEIGHT: 59 IN

## 2020-02-14 PROBLEM — I63.9 CEREBRAL INFARCTION, UNSPECIFIED: Chronic | Status: ACTIVE | Noted: 2020-02-10

## 2020-02-14 PROBLEM — I48.20 CHRONIC ATRIAL FIBRILLATION, UNSPECIFIED: Chronic | Status: ACTIVE | Noted: 2020-02-10

## 2020-02-14 PROCEDURE — L9995: CPT

## 2020-02-14 PROCEDURE — G0463: CPT

## 2020-02-14 NOTE — ED PROVIDER NOTE - OBJECTIVE STATEMENT
88 y/o F presents to the ER for suture removal. Patient was standing on her bed and fell forward on Monday. Patient has laceration to the right side of the face which was repaired by plastic surgery. Patient has hospitalized for subarachnoid hemorrhage and dc yesterday. Patient denies any other complaints.

## 2020-02-14 NOTE — ED PROVIDER NOTE - PATIENT PORTAL LINK FT
You can access the FollowMyHealth Patient Portal offered by [facility] by registering at the following website: [email or web address]. By joining [facility]’s FollowMyHealth portal, you will also be able to view your health information using other applications (apps) compatible with our system.

## 2020-02-26 ENCOUNTER — APPOINTMENT (OUTPATIENT)
Dept: NEUROSURGERY | Facility: CLINIC | Age: 85
End: 2020-02-26
Payer: MEDICAID

## 2020-02-26 VITALS
HEART RATE: 100 BPM | DIASTOLIC BLOOD PRESSURE: 81 MMHG | TEMPERATURE: 98.2 F | OXYGEN SATURATION: 100 % | BODY MASS INDEX: 20.77 KG/M2 | WEIGHT: 110 LBS | SYSTOLIC BLOOD PRESSURE: 131 MMHG | HEIGHT: 61 IN | RESPIRATION RATE: 18 BRPM

## 2020-02-26 DIAGNOSIS — S06.6X9A TRAUMATIC SUBARACHNOID HEMORRHAGE WITH LOSS OF CONSCIOUSNESS OF UNSPECIFIED DURATION, INITIAL ENCOUNTER: ICD-10-CM

## 2020-02-26 PROBLEM — Z00.00 ENCOUNTER FOR PREVENTIVE HEALTH EXAMINATION: Status: ACTIVE | Noted: 2020-02-26

## 2020-02-26 PROCEDURE — 99213 OFFICE O/P EST LOW 20 MIN: CPT

## 2020-05-10 NOTE — ED PROVIDER NOTE - BIRTH SEX
Patient Education        Bradycardia: Care Instructions  Your Care Instructions    Bradycardia is a slow heart rate. A slow heart rate can be normal and healthy. Or it could be a sign of a problem with the heart's electrical system. If your heart beats too slowly, it may not supply your body with enough blood. This can make you weak or dizzy. Or it may make you pass out. Bradycardia can be caused by many things. This includes medicine, certain medical conditions, and changes in the heart that are the result of aging. How bradycardia is treated depends on what is causing it. Treatments include treating another health problem, changing a medicine, and getting a pacemaker. Treatment also depends on the symptoms. If bradycardia doesn't cause symptoms, it may not be treated. You and your doctor can decide what treatment is right for you. Follow-up care is a key part of your treatment and safety. Be sure to make and go to all appointments, and call your doctor if you are having problems. It's also a good idea to know your test results and keep a list of the medicines you take. How can you care for yourself at home? · Take your medicines exactly as prescribed. Call your doctor if you think you are having a problem with your medicine. If your bradycardia is caused by another disease, your doctor will try to treat the disease. If it is caused by heart medicines, he or she will adjust your medicines. · Make lifestyle changes to improve your heart health. ? Eat a heart-healthy diet that includes vegetables, fruits, nuts, beans, lean meat, fish, and whole grains. Limit alcohol, sodium, and sugar. ? Get regular exercise. Try for 30 minutes on most days of the week. If you do not have other heart problems, you likely do not have limits on the type or level of activity that you can do. You may want to walk, swim, bike, or do other activities.  Ask your doctor what level of exercise is safe for you.  ? Stay at a healthy weight. Lose weight if you need to.  ? Do not smoke. If you need help quitting, talk to your doctor about stop-smoking programs and medicines. These can increase your chances of quitting for good. ? Manage other health problems such as high blood pressure, high cholesterol, and diabetes. · If you get a pacemaker, you will get information about it. When should you call for help? Call 911 anytime you think you may need emergency care. For example, call if:    · You have symptoms of sudden heart failure. These may include:  ? Severe trouble breathing. ? A fast or irregular heartbeat. ? Coughing up pink, foamy mucus. ? You passed out.     · You have symptoms of a stroke. These may include:  ? Sudden numbness, tingling, weakness, or loss of movement in your face, arm, or leg, especially on only one side of your body. ? Sudden vision changes. ? Sudden trouble speaking. ? Sudden confusion or trouble understanding simple statements. ? Sudden problems with walking or balance. ? A sudden, severe headache that is different from past headaches.    Call your doctor now or seek immediate medical care if:    · You have new or changed symptoms of heart failure, such as:  ? New or increased shortness of breath. ? New or worse swelling in your legs, ankles, or feet. ? Sudden weight gain, such as more than 2 to 3 pounds in a day or 5 pounds in a week. (Your doctor may suggest a different range of weight gain.)  ? Feeling dizzy or lightheaded or like you may faint. ? Feeling so tired or weak that you cannot do your usual activities. ? Not sleeping well. Shortness of breath wakes you at night. You need extra pillows to prop yourself up to breathe easier.    Watch closely for changes in your health, and be sure to contact your doctor if:    · You do not get better as expected. Where can you learn more?   Go to http://ana-darya.info/  Enter G338 in the search box to learn more about \"Bradycardia: Care Instructions. \"  Current as of: December 15, 2019Content Version: 12.4  © 1814-9809 [employer]. Care instructions adapted under license by [employer] (which disclaims liability or warranty for this information). If you have questions about a medical condition or this instruction, always ask your healthcare professional. [address] any warranty or liability for your use of this information. Patient Education        Preventing Falls: Care Instructions  Your Care Instructions    Getting around your home safely can be a challenge if you have injuries or health problems that make it easy for you to fall. Loose rugs and furniture in walkways are among the dangers for many older people who have problems walking or who have poor eyesight. People who have conditions such as arthritis, osteoporosis, or dementia also have to be careful not to fall. You can make your home safer with a few simple measures. Follow-up care is a key part of your treatment and safety. Be sure to make and go to all appointments, and call your doctor if you are having problems. It's also a good idea to know your test results and keep a list of the medicines you take. How can you care for yourself at home? Taking care of yourself  · You may get dizzy if you do not drink enough water. To prevent dehydration, drink plenty of fluids, enough so that your urine is light yellow or clear like water. Choose water and other caffeine-free clear liquids. If you have kidney, heart, or liver disease and have to limit fluids, talk with your doctor before you increase the amount of fluids you drink. · Exercise regularly to improve your strength, muscle tone, and balance. Walk if you can. Swimming may be a good choice if you cannot walk easily. · Have your vision and hearing checked each year or any time you notice a change.  If you have trouble seeing and hearing, you might not be able to avoid objects and could lose your balance. · Know the side effects of the medicines you take. Ask your doctor or pharmacist whether the medicines you take can affect your balance. Sleeping pills or sedatives can affect your balance. · Limit the amount of alcohol you drink. Alcohol can impair your balance and other senses. · Ask your doctor whether calluses or corns on your feet need to be removed. If you wear loose-fitting shoes because of calluses or corns, you can lose your balance and fall. · Talk to your doctor if you have numbness in your feet. Preventing falls at home  · Remove raised doorway thresholds, throw rugs, and clutter. Repair loose carpet or raised areas in the floor. · Move furniture and electrical cords to keep them out of walking paths. · Use nonskid floor wax, and wipe up spills right away, especially on ceramic tile floors. · If you use a walker or cane, put rubber tips on it. If you use crutches, clean the bottoms of them regularly with an abrasive pad, such as steel wool. · Keep your house well lit, especially [place], and outside walkways. Use night-lights in areas such as hallways and bathrooms. Add extra light switches or use remote switches (such as switches that go on or off when you clap your hands) to make it easier to turn lights on if you have to get up during the night. · Install sturdy handrails on stairways. · Move items in your cabinets so that the things you use a lot are on the lower shelves (about waist level). · Keep a cordless phone and a flashlight with new batteries by your bed. If possible, put a phone in each of the main rooms of your house, or carry a cell phone in case you fall and cannot reach a phone. Or, you can wear a device around your neck or wrist. You push a button that sends a signal for help. · Wear low-heeled shoes that fit well and give your feet good support. Use footwear with nonskid soles.  Check the heels and soles of your shoes for wear. Repair or replace worn heels or soles. · Do not wear socks without shoes on wood floors. · Walk on the grass when the sidewalks are slippery. If you live in an area that gets snow and ice in the winter, sprinkle salt on slippery steps and sidewalks. Preventing falls in the bath  · Install grab bars and nonskid mats inside and outside your shower or tub and near the toilet and sinks. · Use shower chairs and bath benches. · Use a hand-held shower head that will allow you to sit while showering. · Get into a tub or shower by putting the weaker leg in first. Get out of a tub or shower with your strong side first.  · Repair loose toilet seats and consider installing a raised toilet seat to make getting on and off the toilet easier. · Keep your bathroom door unlocked while you are in the shower. Where can you learn more? Go to http://ana-darya.info/. Enter [id number] [id number] in the search box to learn more about \"Preventing Falls: Care Instructions. \"  Current as of: March 16, 2018  Content Version: 11.8  © 3212-2834 [employer]. Care instructions adapted under license by [employer] (which disclaims liability or warranty for this information). If you have questions about a medical condition or this instruction, always ask your healthcare professional. [place] [address] any warranty or liability for your use of this information. Patient Education        Low Blood Pressure: Care Instructions  Your Care Instructions    Blood pressure is a measurement of the force of the blood against the walls of the blood vessels during and after each beat of the heart. Low blood pressure is also called hypotension. It means that your blood pressure is much lower than normal. Some people, especially young, slim women, may have slightly low blood pressure without symptoms. But in many people, low blood pressure can cause symptoms such as feeling dizzy or lightheaded. When your blood pressure is too low, your heart, brain, and other organs do not get enough blood. Low blood pressure can be caused by many things, including heart problems and some medicines. Diabetes that is not under control can cause your blood pressure to drop. And so can a severe allergic reaction or infection. Another cause is dehydration, which is when your body loses too much fluid. Treatment for low blood pressure depends on the cause. Follow-up care is a key part of your treatment and safety. Be sure to make and go to all appointments, and call your doctor if you are having problems. It's also a good idea to know your test results and keep a list of the medicines you take. How can you care for yourself at home? · Be safe with medicines. Call your doctor if you think you are having a problem with your medicine. You will get more details on the specific medicines your doctor prescribes. · If you feel dizzy or lightheaded, sit down or lie down for a few minutes. Or you can sit down and put your head between your knees. This will help your blood pressure go back to normal and help your symptoms go away. · Follow your doctor's suggestions for ways to prevent symptoms like dizziness. These suggestions may include:  ? Get up slowly from bed or after sitting for a long time. If you are in bed, roll to your side and swing your legs over the edge of the bed and onto the floor. Push your body up to a sitting position. Wait for a while before you slowly stand up.  ? Add more salt to your diet, if your doctor recommends it. ? Drink plenty of fluids, enough so that your urine is light yellow or clear like water. Choose water and other caffeine-free clear liquids. If you have kidney, heart, or liver disease and have to limit fluids, talk with your doctor before you increase the amount of fluids you drink. ? Avoid or limit alcohol to 2 drinks a day for men and 1 drink a day for women.  Alcohol may interfere with your medicine. In addition, alcohol can make your low blood pressure worse by causing your body to lose water. ? Avoid or limit caffeine. Caffeine can cause your body to lose water. ? Wear compression stockings to help improve blood flow. When should you call for help? Call 911 anytime you think you may need emergency care. For example, call if:    · You passed out (lost consciousness).    Call your doctor now or seek immediate medical care if:    · You are dizzy or lightheaded, or you feel like you may faint.    Watch closely for changes in your health, and be sure to contact your doctor if you have any problems. Where can you learn more? Go to http://ana-darya.info/  Enter C304 in the search box to learn more about \"Low Blood Pressure: Care Instructions. \"  Current as of: December 15, 2019Content Version: 12.4  © 8735-4885 [employer]. Care instructions adapted under license by [employer] (which disclaims liability or warranty for this information). If you have questions about a medical condition or this instruction, always ask your healthcare professional. [address] any warranty or liability for your use of this information. Female

## 2020-06-23 NOTE — ED ADULT NURSE NOTE - NSFALLRSKHRMRISKTYPE_ED_ALL_ED
lisinopril (ZESTRIL) 10 MG tablet   Last refill 1/28/20  lov 1/28/20   age(85 years old or older)/coagulation(Bleeding disorder R/T clinical cond/anti-coags)

## 2020-07-01 ENCOUNTER — APPOINTMENT (OUTPATIENT)
Dept: CT IMAGING | Facility: HOSPITAL | Age: 85
End: 2020-07-01
Payer: MEDICAID

## 2020-07-01 ENCOUNTER — OUTPATIENT (OUTPATIENT)
Dept: OUTPATIENT SERVICES | Facility: HOSPITAL | Age: 85
LOS: 1 days | End: 2020-07-01
Payer: MEDICAID

## 2020-07-01 DIAGNOSIS — S06.6X9A TRAUMATIC SUBARACHNOID HEMORRHAGE WITH LOSS OF CONSCIOUSNESS OF UNSPECIFIED DURATION, INITIAL ENCOUNTER: ICD-10-CM

## 2020-07-01 PROCEDURE — 70450 CT HEAD/BRAIN W/O DYE: CPT

## 2020-07-02 ENCOUNTER — RESULT REVIEW (OUTPATIENT)
Age: 85
End: 2020-07-02

## 2020-07-02 PROCEDURE — 70450 CT HEAD/BRAIN W/O DYE: CPT | Mod: 26

## 2020-07-08 ENCOUNTER — APPOINTMENT (OUTPATIENT)
Dept: NEUROSURGERY | Facility: CLINIC | Age: 85
End: 2020-07-08
Payer: MEDICAID

## 2020-07-08 PROCEDURE — 99213 OFFICE O/P EST LOW 20 MIN: CPT | Mod: 95

## 2020-07-08 NOTE — HISTORY OF PRESENT ILLNESS
[Home] : at home, [unfilled] , at the time of the visit. [Medical Office: ([facility])___] : at the medical office located in  [Family Member] : family member [Other:____] : [unfilled] [Verbal consent obtained from patient] : the patient, [unfilled] [FreeTextEntry1] : 87F from home, lives with daughter-in-law, w/ PMHx Afib (on Eliquis), HTN, CVA (2017) who was transferred from [facility] for trace SAH. presented to the [facility] ED after having a mechanical fall at home while hanging clothes. Patient reports that when she stepped on the bed to hang clothes, she lost balance and fell to the floor, hitting R side of the face with her eyeglasses on. Patient subsequently started bleeding which prompted family to bring her to the ED for further evaluation. Denies dizziness, chest pain, palpitations or any symptoms prior or during the event. Pt also took Aspirin 81mg yesterday morning. Patient was given Kcentra and ddavp in the ED. Her repeat CT were stable and  reviewed by neurosurgery team. On the day of discharge patient is neurologically and hemodynamically stable and clear for discharge home. \par \par Today Mrs. Trevizo presented for telehealth with her daughter in law, for follow up appointment  .She reports feeling good. She has been doing better.  No HA, dizziness, N/V.  She is walking at home and doing her ADLs very carefully and did not have any falls or trauma. They would like to know the status of the new scan.

## 2020-07-08 NOTE — ASSESSMENT
[FreeTextEntry1] : Impression:\par \par 88 years old with SDH after a fall with no neurological complaints. Off  Eliquis and ASA since the fall.New CT head from July 2020 showed complete resolution of hematoma.\par \par Plan:\par   May restart ASA and Eliquis\par   Follow up with cardiology and follow instructions\par   Precautions given for  prevention of  fall\par   Warning signs to immediate follow up advised\par   Will follow up as needed\par

## 2020-07-08 NOTE — PHYSICAL EXAM
[General Appearance - Alert] : alert [General Appearance - In No Acute Distress] : in no acute distress [Oriented To Time, Place, And Person] : oriented to person, place, and time [General Appearance - Well-Appearing] : healthy appearing [General Appearance - Well Nourished] : well nourished [Impaired Insight] : insight and judgment were intact [Memory Recent] : recent memory was not impaired [Affect] : the affect was normal [Person] : oriented to person [Short Term Intact] : short term memory intact [Place] : oriented to place [Time] : oriented to time [Balance] : balance was intact [Abnormal Walk] : normal gait [FreeTextEntry8] : No pronator drift

## 2020-12-01 NOTE — ED ADULT NURSE NOTE - ATTEMPT TO OOB
Headache Monitoring: I recommended monitoring the headaches for now. There is no evidence of increased intracranial pressure. They were instructed to call if the headaches are worsening. no

## 2022-05-11 NOTE — ED ADULT TRIAGE NOTE - TEMPERATURE IN CELSIUS (DEGREES C)
Niharika Tovar was seen and treated in our emergency department on 5/11/2022. She may return to work on 05/15/2022. If you have any questions or concerns, please don't hesitate to call.       Reyes Johnston MD
37.1

## 2022-12-12 NOTE — ED ADULT NURSE NOTE - NSSUHOSCREENINGYN_ED_ALL_ED
Yes - the patient is able to be screened Ftsg Text: The defect edges were debeveled with a #15 scalpel blade.  Given the location of the defect, shape of the defect and the proximity to free margins a full thickness skin graft was deemed most appropriate.  Using a sterile surgical marker, the primary defect shape was transferred to the donor site. The area thus outlined was incised deep to adipose tissue with a #15 scalpel blade.  The harvested graft was then trimmed of adipose tissue until only dermis and epidermis was left.  The skin margins of the secondary defect were undermined to an appropriate distance in all directions utilizing iris scissors.  The secondary defect was closed with interrupted buried subcutaneous sutures.  The skin edges were then re-apposed with running  sutures.  The skin graft was then placed in the primary defect and oriented appropriately.

## 2024-09-03 NOTE — DIETITIAN INITIAL EVALUATION ADULT. - PROBLEM SELECTOR PROBLEM 2
Physical Therapy Visit    Visit Type: Daily Treatment Note  Visit: 9  Referring Provider: VENKATA Gao  Medical Diagnosis (from order): S39.012A - Strain of lumbar region, initial encounter  Z91.81 - At moderate risk for fall     SUBJECTIVE                                                                                                               Patient stated her back is getting better and better slowly.  Functional Change: Rolling in bed, bridging for butt scoots in bed    Pain / Symptoms  - Pain rating (out of 10): Current: 2       OBJECTIVE                                                                                                                                      Treatment     Therapeutic Exercise  NuStep Seat 5, UE 7, L5 - 6 min  Gastroc slant board stretch - 2x30s  Standing hamstring stair stretch - 30s bilateral    Manual Therapy   Soft tissue mobilization to left lumbar paraspinals and quadratus lumborum in side lying    Neuromuscular Re-Education  Tandem stance on level surface - 2x30s ea  Standing transverse abdominus brace marching red theraband - 1 min  Palloff press green theraband -15x, hold 3s bilateral   Side lying clams - 20x bilateral   Supine posterior pelvic tilt - 20x, hold 5s    Skilled input: verbal instruction/cues, tactile instruction/cues and posture correction    Writer verbally educated and received verbal consent for hand placement, positioning of patient, and techniques to be performed today from patient for hand placement and palpation for techniques, therapist position for techniques and clothing adjustments for techniques as described above and how they are pertinent to the patient's plan of care.  Home Exercise Program  Access Code: [email or web address]  URL: [email or web address]  Date: 08/15/2024  Prepared by: Silvia Oliva    Exercises  - Supine Hip Adduction Isometric with Ball  - 1 x daily - 5 x weekly - 2 sets - 10 reps - 3-5 sec hold  - Supine  Lower Trunk Rotation  - 1 x daily - 5 x weekly - 2 sets - 10 reps - 3 sec hold  - Hooklying Single Knee to Chest Stretch  - 1 x daily - 5 x weekly - 3-5 reps - 15 sec hold  - Supine Piriformis Stretch with Foot on Ground  - 1 x daily - 5 x weekly - 3 reps - 30 sec hold  - Standing March with Counter Support  - 1 x daily - 4 x weekly - 2 sets - 10 reps  - Standing Hip Abduction with Counter Support  - 1 x daily - 4 x weekly - 2 sets - 10 reps  - Seated Hamstring Stretch  - 1 x daily - 5 x weekly - 3 reps - 30 sec hold      ASSESSMENT                                                                                                            Transitioned to weightbearing therapeutic exercises that activate transverse abdominus to engage during activities of daily living and decrease stresses on lumbar spine.  Demonstrated fair (+) static balance in tandem on level surface.  Educated on upcoming PN and POC.  Skilled physical therapy remains necessary to continue increasing strength and ROM for improved mobility and patient level of function.  Pain/symptoms after session (out of 10): 2  Education:   - Results of above outlined education: Verbalizes understanding    PLAN                                                                                                                           Suggestions for next session as indicated: Progress per plan of care    Next visit:  PN       Therapy procedure time and total treatment time can be found documented on the Time Entry flowsheet     HTN (hypertension)

## 2024-10-22 NOTE — DISCHARGE NOTE ADULT - NS AS DC VTE INSTRUCTION
"Subjective   Patient ID: Main Avina is a 35 y.o. female who presents for Acne (Discuss referral ).    HPI   Has acne since age 30 (no other skin complaints prior to age 30).  Tried dietary changes (more fruits and veggies, more water).  Worried about the severity of breakouts (mainly forehead, cheeks, chin).  Saw derm, was told that it was simply acne, not necessarily happy w/that answer because she doesn't feel it explains why it just started at age 30.  Wonders if it could be hormonal (has not discussed w/GYN, has not tried OCPs).  Requests allergy referral for testing to see if anything is contributing.    Review of Systems  No other complaints.     Objective   /73   Pulse 84   Resp 14   Ht 1.549 m (5' 1\")   Wt 58 kg (127 lb 12.8 oz)   SpO2 98%   BMI 24.15 kg/m²     Physical Exam  Constitutional:       General: She is not in acute distress.     Appearance: She is overweight.   Skin:     Comments: +Acne on forehead   Neurological:      Mental Status: She is oriented to person, place, and time.   Psychiatric:         Mood and Affect: Mood normal.         Behavior: Behavior normal.     Assessment/Plan   Diagnoses and all orders for this visit:  Acne, unspecified acne type  -     Referral to Allergy; Future    Referred to allergist as requested.  Consider following up with GYN to discuss hormones, birth control, etc.  Let me know if you would like a derm referral ([facility]) in the future for second opinion.    Schedule annual wellness visit.   " Coumadin/Warfarin - Dietary Advice.../Coumadin/Warfarin - Follow-up monitoring.../Coumadin/Warfarin - Potential for adverse drug reactions and interactions/Coumadin/Warfarin - Compliance...

## 2025-03-10 NOTE — DIETITIAN INITIAL EVALUATION ADULT. - SIGNS/SYMPTOMS
Future Appointments   Date Time Provider Mike Tiffany   6/12/2019  1:20 PM Katya Kumar MD [email or web address]
PT C/O L/MID CHEST PAIN, AND L ARM PAIN FROM HAND UP TO SHOULDER/NECK, PT AKBAR INJURY, PT A&OX4, SKIN W/D/PINK, 0 DISTRESS, STEADY GAIT NOTED. 0 OTHER SYMPTOMS REPORTED.  
50 to 60% intake in-house per flow sheets